# Patient Record
Sex: MALE | Race: WHITE | Employment: FULL TIME | URBAN - METROPOLITAN AREA
[De-identification: names, ages, dates, MRNs, and addresses within clinical notes are randomized per-mention and may not be internally consistent; named-entity substitution may affect disease eponyms.]

---

## 2018-06-06 ENCOUNTER — APPOINTMENT (OUTPATIENT)
Dept: ULTRASOUND IMAGING | Age: 38
End: 2018-06-06

## 2018-06-06 ENCOUNTER — APPOINTMENT (OUTPATIENT)
Dept: CT IMAGING | Age: 38
End: 2018-06-06

## 2018-06-06 ENCOUNTER — HOSPITAL ENCOUNTER (EMERGENCY)
Age: 38
Discharge: HOME | End: 2018-06-06
Attending: EMERGENCY MEDICINE

## 2018-06-06 VITALS
SYSTOLIC BLOOD PRESSURE: 132 MMHG | OXYGEN SATURATION: 98 % | RESPIRATION RATE: 16 BRPM | HEIGHT: 71 IN | TEMPERATURE: 97.5 F | HEART RATE: 62 BPM | BODY MASS INDEX: 25.2 KG/M2 | WEIGHT: 180 LBS | DIASTOLIC BLOOD PRESSURE: 90 MMHG

## 2018-06-06 DIAGNOSIS — R59.0 LYMPHADENOPATHY, INGUINAL: ICD-10-CM

## 2018-06-06 DIAGNOSIS — N43.3 HYDROCELE IN ADULT: ICD-10-CM

## 2018-06-06 DIAGNOSIS — N20.0 RENAL CALCULUS OR STONE: Primary | ICD-10-CM

## 2018-06-06 DIAGNOSIS — I86.1 LEFT VARICOCELE: ICD-10-CM

## 2018-06-06 LAB
ALBUMIN SERPL-MCNC: 4.6 G/DL (ref 3.5–5.2)
ALP BLD-CCNC: 67 U/L (ref 40–129)
ALT SERPL-CCNC: 23 U/L (ref 0–40)
ANION GAP SERPL CALCULATED.3IONS-SCNC: 12 MMOL/L (ref 7–16)
AST SERPL-CCNC: 32 U/L (ref 0–39)
BACTERIA: NORMAL /HPF
BASOPHILS ABSOLUTE: 0.05 E9/L (ref 0–0.2)
BASOPHILS RELATIVE PERCENT: 0.8 % (ref 0–2)
BILIRUB SERPL-MCNC: 0.5 MG/DL (ref 0–1.2)
BILIRUBIN URINE: NEGATIVE
BLOOD, URINE: NEGATIVE
BUN BLDV-MCNC: 9 MG/DL (ref 6–20)
CALCIUM SERPL-MCNC: 9.8 MG/DL (ref 8.6–10.2)
CHLORIDE BLD-SCNC: 100 MMOL/L (ref 98–107)
CLARITY: CLEAR
CO2: 28 MMOL/L (ref 22–29)
COLOR: YELLOW
CREAT SERPL-MCNC: 0.8 MG/DL (ref 0.7–1.2)
EOSINOPHILS ABSOLUTE: 0.09 E9/L (ref 0.05–0.5)
EOSINOPHILS RELATIVE PERCENT: 1.5 % (ref 0–6)
GFR AFRICAN AMERICAN: >60
GFR NON-AFRICAN AMERICAN: >60 ML/MIN/1.73
GLUCOSE BLD-MCNC: 114 MG/DL (ref 74–109)
GLUCOSE URINE: NEGATIVE MG/DL
HCT VFR BLD CALC: 47.9 % (ref 37–54)
HEMOGLOBIN: 15.8 G/DL (ref 12.5–16.5)
IMMATURE GRANULOCYTES #: 0.02 E9/L
IMMATURE GRANULOCYTES %: 0.3 % (ref 0–5)
KETONES, URINE: NEGATIVE MG/DL
LACTIC ACID, SEPSIS: 1.7 MMOL/L (ref 0.5–1.9)
LEUKOCYTE ESTERASE, URINE: NEGATIVE
LYMPHOCYTES ABSOLUTE: 1.54 E9/L (ref 1.5–4)
LYMPHOCYTES RELATIVE PERCENT: 25.7 % (ref 20–42)
MCH RBC QN AUTO: 30 PG (ref 26–35)
MCHC RBC AUTO-ENTMCNC: 33 % (ref 32–34.5)
MCV RBC AUTO: 91.1 FL (ref 80–99.9)
MONOCYTES ABSOLUTE: 0.49 E9/L (ref 0.1–0.95)
MONOCYTES RELATIVE PERCENT: 8.2 % (ref 2–12)
NEUTROPHILS ABSOLUTE: 3.8 E9/L (ref 1.8–7.3)
NEUTROPHILS RELATIVE PERCENT: 63.5 % (ref 43–80)
NITRITE, URINE: NEGATIVE
PDW BLD-RTO: 12.9 FL (ref 11.5–15)
PH UA: 8 (ref 5–9)
PLATELET # BLD: 142 E9/L (ref 130–450)
PMV BLD AUTO: 11.7 FL (ref 7–12)
POTASSIUM SERPL-SCNC: 4.7 MMOL/L (ref 3.5–5)
PROTEIN UA: NEGATIVE MG/DL
RBC # BLD: 5.26 E12/L (ref 3.8–5.8)
RBC UA: NORMAL /HPF (ref 0–2)
SODIUM BLD-SCNC: 140 MMOL/L (ref 132–146)
SPECIFIC GRAVITY UA: 1.02 (ref 1–1.03)
TOTAL PROTEIN: 7.7 G/DL (ref 6.4–8.3)
UROBILINOGEN, URINE: 0.2 E.U./DL
WBC # BLD: 6 E9/L (ref 4.5–11.5)
WBC UA: NORMAL /HPF (ref 0–5)

## 2018-06-06 PROCEDURE — 2580000003 HC RX 258

## 2018-06-06 PROCEDURE — 96374 THER/PROPH/DIAG INJ IV PUSH: CPT

## 2018-06-06 PROCEDURE — 80053 COMPREHEN METABOLIC PANEL: CPT

## 2018-06-06 PROCEDURE — 81001 URINALYSIS AUTO W/SCOPE: CPT

## 2018-06-06 PROCEDURE — 6360000002 HC RX W HCPCS: Performed by: EMERGENCY MEDICINE

## 2018-06-06 PROCEDURE — 76870 US EXAM SCROTUM: CPT

## 2018-06-06 PROCEDURE — 85025 COMPLETE CBC W/AUTO DIFF WBC: CPT

## 2018-06-06 PROCEDURE — 93975 VASCULAR STUDY: CPT

## 2018-06-06 PROCEDURE — 74176 CT ABD & PELVIS W/O CONTRAST: CPT

## 2018-06-06 PROCEDURE — 83605 ASSAY OF LACTIC ACID: CPT

## 2018-06-06 PROCEDURE — 99284 EMERGENCY DEPT VISIT MOD MDM: CPT

## 2018-06-06 RX ORDER — SODIUM CHLORIDE 0.9 % (FLUSH) 0.9 %
SYRINGE (ML) INJECTION
Status: COMPLETED
Start: 2018-06-06 | End: 2018-06-06

## 2018-06-06 RX ORDER — KETOROLAC TROMETHAMINE 30 MG/ML
15 INJECTION, SOLUTION INTRAMUSCULAR; INTRAVENOUS ONCE
Status: COMPLETED | OUTPATIENT
Start: 2018-06-06 | End: 2018-06-06

## 2018-06-06 RX ADMIN — KETOROLAC TROMETHAMINE 15 MG: 30 INJECTION, SOLUTION INTRAMUSCULAR at 11:46

## 2018-06-06 RX ADMIN — Medication 10 ML: at 11:47

## 2018-06-06 ASSESSMENT — ENCOUNTER SYMPTOMS
CONSTIPATION: 0
VOMITING: 0
DIARRHEA: 0
ABDOMINAL PAIN: 0
BACK PAIN: 0
COLOR CHANGE: 0
NAUSEA: 0

## 2018-06-06 ASSESSMENT — PAIN DESCRIPTION - DESCRIPTORS: DESCRIPTORS: BURNING

## 2018-06-06 ASSESSMENT — PAIN DESCRIPTION - PAIN TYPE: TYPE: ACUTE PAIN

## 2018-06-06 ASSESSMENT — PAIN DESCRIPTION - LOCATION: LOCATION: GROIN

## 2018-06-06 ASSESSMENT — PAIN SCALES - WONG BAKER: WONGBAKER_NUMERICALRESPONSE: 2

## 2018-06-06 ASSESSMENT — PAIN SCALES - GENERAL
PAINLEVEL_OUTOF10: 3
PAINLEVEL_OUTOF10: 7

## 2021-09-14 ENCOUNTER — OFFICE VISIT (OUTPATIENT)
Dept: FAMILY MEDICINE CLINIC | Age: 41
End: 2021-09-14

## 2021-09-14 VITALS
HEART RATE: 65 BPM | BODY MASS INDEX: 23.71 KG/M2 | DIASTOLIC BLOOD PRESSURE: 82 MMHG | OXYGEN SATURATION: 98 % | WEIGHT: 170 LBS | SYSTOLIC BLOOD PRESSURE: 122 MMHG | TEMPERATURE: 97.8 F

## 2021-09-14 DIAGNOSIS — R07.9 CHEST PAIN, UNSPECIFIED TYPE: Primary | ICD-10-CM

## 2021-09-14 PROCEDURE — 99203 OFFICE O/P NEW LOW 30 MIN: CPT | Performed by: PHYSICIAN ASSISTANT

## 2021-09-14 PROCEDURE — 93000 ELECTROCARDIOGRAM COMPLETE: CPT | Performed by: PHYSICIAN ASSISTANT

## 2021-09-14 RX ORDER — OMEPRAZOLE 40 MG/1
CAPSULE, DELAYED RELEASE ORAL
COMMUNITY
Start: 2021-08-18

## 2021-09-14 RX ORDER — TADALAFIL 5 MG/1
TABLET ORAL
Status: ON HOLD | COMMUNITY
Start: 2021-08-18 | End: 2021-09-18 | Stop reason: HOSPADM

## 2021-09-14 ASSESSMENT — ENCOUNTER SYMPTOMS
COUGH: 0
SHORTNESS OF BREATH: 0
NAUSEA: 0
VOMITING: 0
BACK PAIN: 0
DIARRHEA: 0
PHOTOPHOBIA: 0
ABDOMINAL PAIN: 0
SORE THROAT: 0

## 2021-09-14 NOTE — PROGRESS NOTES
photophobia and visual disturbance. Respiratory: Negative for cough and shortness of breath. Cardiovascular: Positive for chest pain. Gastrointestinal: Negative for abdominal pain, diarrhea, nausea and vomiting. Genitourinary: Negative for difficulty urinating, dysuria, frequency and urgency. Musculoskeletal: Negative for back pain, neck pain and neck stiffness. Skin: Negative for rash. Neurological: Negative for dizziness, syncope, weakness, light-headedness and headaches. Hematological: Negative for adenopathy. Does not bruise/bleed easily. Psychiatric/Behavioral: Negative for agitation and confusion. All other systems reviewed and are negative. PMH:     Past Medical History:   Diagnosis Date    Kidney stone        Past Surgical History:   Procedure Laterality Date    HERNIA REPAIR      TONSILLECTOMY         History reviewed. No pertinent family history. Medications:     Current Outpatient Medications:     tadalafil (CIALIS) 5 MG tablet, TAKE ONE TABLET BY MOUTH EVERY DAY, Disp: , Rfl:     omeprazole (PRILOSEC) 40 MG delayed release capsule, TAKE ONE CAPSULE BY MOUTH EVERY DAY before a meal, Disp: , Rfl:     Allergies: Allergies   Allergen Reactions    Vicodin [Hydrocodone-Acetaminophen] Itching       Social History:     Social History     Tobacco Use    Smoking status: Current Every Day Smoker     Packs/day: 0.50     Types: Cigarettes    Smokeless tobacco: Never Used   Substance Use Topics    Alcohol use: No    Drug use: No       Patient lives at home. Physical Exam:     Vitals:    09/14/21 1533   BP: 122/82   Pulse: 65   Temp: 97.8 °F (36.6 °C)   TempSrc: Temporal   SpO2: 98%   Weight: 170 lb (77.1 kg)       Exam:  Physical Exam  Vitals and nursing note reviewed. Constitutional:       General: He is not in acute distress. Appearance: He is well-developed. HENT:      Head: Normocephalic and atraumatic.    Eyes:      Conjunctiva/sclera: Conjunctivae normal. unspecified type  -     EKG 12 lead; Future  -     EKG 12 lead        Go directly to the emergency department. SIGNATURE: Shameka Hernandez PA-C

## 2021-09-17 ENCOUNTER — HOSPITAL ENCOUNTER (OUTPATIENT)
Age: 41
Setting detail: OBSERVATION
Discharge: HOME | End: 2021-09-18
Attending: FAMILY MEDICINE | Admitting: FAMILY MEDICINE

## 2021-09-17 DIAGNOSIS — I25.10 CAD IN NATIVE ARTERY: ICD-10-CM

## 2021-09-17 PROBLEM — Z72.0 TOBACCO ABUSE: Status: ACTIVE | Noted: 2021-09-17

## 2021-09-17 PROBLEM — F12.90 MARIJUANA USE: Status: ACTIVE | Noted: 2021-09-17

## 2021-09-17 PROBLEM — K21.9 GERD (GASTROESOPHAGEAL REFLUX DISEASE): Status: ACTIVE | Noted: 2021-09-17

## 2021-09-17 PROBLEM — I24.9 ACS (ACUTE CORONARY SYNDROME) (HCC): Status: ACTIVE | Noted: 2021-09-17

## 2021-09-17 LAB
POC ACT LR: 232 SECONDS
POC ACT LR: 267 SECONDS
POC ACT LR: >400 SECONDS

## 2021-09-17 PROCEDURE — 2580000003 HC RX 258: Performed by: INTERNAL MEDICINE

## 2021-09-17 PROCEDURE — G0378 HOSPITAL OBSERVATION PER HR: HCPCS

## 2021-09-17 PROCEDURE — C1894 INTRO/SHEATH, NON-LASER: HCPCS

## 2021-09-17 PROCEDURE — C1725 CATH, TRANSLUMIN NON-LASER: HCPCS

## 2021-09-17 PROCEDURE — 2709999900 HC NON-CHARGEABLE SUPPLY

## 2021-09-17 PROCEDURE — G0379 DIRECT REFER HOSPITAL OBSERV: HCPCS

## 2021-09-17 PROCEDURE — 93454 CORONARY ARTERY ANGIO S&I: CPT | Performed by: INTERNAL MEDICINE

## 2021-09-17 PROCEDURE — C1874 STENT, COATED/COV W/DEL SYS: HCPCS

## 2021-09-17 PROCEDURE — 2500000003 HC RX 250 WO HCPCS

## 2021-09-17 PROCEDURE — 85347 COAGULATION TIME ACTIVATED: CPT

## 2021-09-17 PROCEDURE — 6370000000 HC RX 637 (ALT 250 FOR IP)

## 2021-09-17 PROCEDURE — C1769 GUIDE WIRE: HCPCS

## 2021-09-17 PROCEDURE — 92928 PRQ TCAT PLMT NTRAC ST 1 LES: CPT | Performed by: INTERNAL MEDICINE

## 2021-09-17 PROCEDURE — 6370000000 HC RX 637 (ALT 250 FOR IP): Performed by: INTERNAL MEDICINE

## 2021-09-17 PROCEDURE — C1887 CATHETER, GUIDING: HCPCS

## 2021-09-17 PROCEDURE — 6360000002 HC RX W HCPCS

## 2021-09-17 RX ORDER — SODIUM CHLORIDE 9 MG/ML
INJECTION, SOLUTION INTRAVENOUS CONTINUOUS
Status: ACTIVE | OUTPATIENT
Start: 2021-09-17 | End: 2021-09-18

## 2021-09-17 RX ORDER — SODIUM CHLORIDE 9 MG/ML
25 INJECTION, SOLUTION INTRAVENOUS PRN
Status: DISCONTINUED | OUTPATIENT
Start: 2021-09-17 | End: 2021-09-18 | Stop reason: HOSPADM

## 2021-09-17 RX ORDER — PANTOPRAZOLE SODIUM 40 MG/1
40 TABLET, DELAYED RELEASE ORAL
Status: DISCONTINUED | OUTPATIENT
Start: 2021-09-18 | End: 2021-09-18 | Stop reason: HOSPADM

## 2021-09-17 RX ORDER — ASPIRIN 81 MG/1
81 TABLET ORAL DAILY
Status: DISCONTINUED | OUTPATIENT
Start: 2021-09-18 | End: 2021-09-18 | Stop reason: HOSPADM

## 2021-09-17 RX ORDER — SODIUM CHLORIDE 0.9 % (FLUSH) 0.9 %
5-40 SYRINGE (ML) INJECTION EVERY 12 HOURS SCHEDULED
Status: DISCONTINUED | OUTPATIENT
Start: 2021-09-17 | End: 2021-09-18 | Stop reason: HOSPADM

## 2021-09-17 RX ORDER — ATORVASTATIN CALCIUM 40 MG/1
40 TABLET, FILM COATED ORAL NIGHTLY
Status: DISCONTINUED | OUTPATIENT
Start: 2021-09-17 | End: 2021-09-18 | Stop reason: HOSPADM

## 2021-09-17 RX ORDER — SODIUM CHLORIDE 0.9 % (FLUSH) 0.9 %
5-40 SYRINGE (ML) INJECTION PRN
Status: DISCONTINUED | OUTPATIENT
Start: 2021-09-17 | End: 2021-09-18 | Stop reason: HOSPADM

## 2021-09-17 RX ORDER — NICOTINE 21 MG/24HR
1 PATCH, TRANSDERMAL 24 HOURS TRANSDERMAL EVERY 24 HOURS
COMMUNITY
End: 2021-10-22

## 2021-09-17 RX ORDER — NICOTINE 21 MG/24HR
1 PATCH, TRANSDERMAL 24 HOURS TRANSDERMAL EVERY 24 HOURS
Status: DISCONTINUED | OUTPATIENT
Start: 2021-09-17 | End: 2021-09-18 | Stop reason: HOSPADM

## 2021-09-17 RX ADMIN — TICAGRELOR 90 MG: 90 TABLET ORAL at 22:07

## 2021-09-17 RX ADMIN — SODIUM CHLORIDE, PRESERVATIVE FREE 10 ML: 5 INJECTION INTRAVENOUS at 22:32

## 2021-09-17 RX ADMIN — SODIUM CHLORIDE: 9 INJECTION, SOLUTION INTRAVENOUS at 22:30

## 2021-09-17 RX ADMIN — METOPROLOL TARTRATE 25 MG: 25 TABLET, FILM COATED ORAL at 22:06

## 2021-09-17 RX ADMIN — ATORVASTATIN CALCIUM 40 MG: 40 TABLET, FILM COATED ORAL at 22:06

## 2021-09-17 ASSESSMENT — PAIN SCALES - GENERAL
PAINLEVEL_OUTOF10: 0

## 2021-09-18 VITALS
HEART RATE: 84 BPM | HEIGHT: 71 IN | TEMPERATURE: 97.6 F | DIASTOLIC BLOOD PRESSURE: 92 MMHG | BODY MASS INDEX: 23.52 KG/M2 | WEIGHT: 168 LBS | OXYGEN SATURATION: 98 % | SYSTOLIC BLOOD PRESSURE: 137 MMHG | RESPIRATION RATE: 16 BRPM

## 2021-09-18 LAB
ANION GAP SERPL CALCULATED.3IONS-SCNC: 13 MMOL/L (ref 7–16)
BUN BLDV-MCNC: 11 MG/DL (ref 6–20)
CALCIUM SERPL-MCNC: 8.6 MG/DL (ref 8.6–10.2)
CHLORIDE BLD-SCNC: 103 MMOL/L (ref 98–107)
CO2: 19 MMOL/L (ref 22–29)
CREAT SERPL-MCNC: 0.9 MG/DL (ref 0.7–1.2)
GFR AFRICAN AMERICAN: >60
GFR NON-AFRICAN AMERICAN: >60 ML/MIN/1.73
GLUCOSE BLD-MCNC: 92 MG/DL (ref 74–99)
HCT VFR BLD CALC: 42.5 % (ref 37–54)
HEMOGLOBIN: 14.2 G/DL (ref 12.5–16.5)
MCH RBC QN AUTO: 29.7 PG (ref 26–35)
MCHC RBC AUTO-ENTMCNC: 33.4 % (ref 32–34.5)
MCV RBC AUTO: 88.9 FL (ref 80–99.9)
PDW BLD-RTO: 12.9 FL (ref 11.5–15)
PLATELET # BLD: 166 E9/L (ref 130–450)
PMV BLD AUTO: 11.1 FL (ref 7–12)
POC ACT LR: 220 SECONDS
POTASSIUM SERPL-SCNC: 4.1 MMOL/L (ref 3.5–5)
RBC # BLD: 4.78 E12/L (ref 3.8–5.8)
SODIUM BLD-SCNC: 135 MMOL/L (ref 132–146)
WBC # BLD: 13.3 E9/L (ref 4.5–11.5)

## 2021-09-18 PROCEDURE — 6370000000 HC RX 637 (ALT 250 FOR IP): Performed by: INTERNAL MEDICINE

## 2021-09-18 PROCEDURE — 85027 COMPLETE CBC AUTOMATED: CPT

## 2021-09-18 PROCEDURE — 80048 BASIC METABOLIC PNL TOTAL CA: CPT

## 2021-09-18 PROCEDURE — 2580000003 HC RX 258: Performed by: INTERNAL MEDICINE

## 2021-09-18 PROCEDURE — 36415 COLL VENOUS BLD VENIPUNCTURE: CPT

## 2021-09-18 PROCEDURE — G0378 HOSPITAL OBSERVATION PER HR: HCPCS

## 2021-09-18 RX ORDER — ATORVASTATIN CALCIUM 40 MG/1
40 TABLET, FILM COATED ORAL NIGHTLY
Qty: 30 TABLET | Refills: 0 | Status: SHIPPED | OUTPATIENT
Start: 2021-09-18

## 2021-09-18 RX ORDER — METOPROLOL SUCCINATE 25 MG/1
25 TABLET, EXTENDED RELEASE ORAL DAILY
Status: DISCONTINUED | OUTPATIENT
Start: 2021-09-18 | End: 2021-09-18 | Stop reason: HOSPADM

## 2021-09-18 RX ORDER — NITROGLYCERIN 0.4 MG/1
0.4 TABLET SUBLINGUAL EVERY 5 MIN PRN
Status: DISCONTINUED | OUTPATIENT
Start: 2021-09-18 | End: 2021-09-18 | Stop reason: HOSPADM

## 2021-09-18 RX ORDER — NITROGLYCERIN 0.4 MG/1
TABLET SUBLINGUAL
Qty: 25 TABLET | Refills: 0 | Status: SHIPPED | OUTPATIENT
Start: 2021-09-18

## 2021-09-18 RX ORDER — ASPIRIN 81 MG/1
81 TABLET ORAL DAILY
Qty: 30 TABLET | Refills: 0 | Status: SHIPPED | OUTPATIENT
Start: 2021-09-19

## 2021-09-18 RX ORDER — METOPROLOL SUCCINATE 25 MG/1
25 TABLET, EXTENDED RELEASE ORAL DAILY
Qty: 30 TABLET | Refills: 0 | Status: SHIPPED | OUTPATIENT
Start: 2021-09-19

## 2021-09-18 RX ORDER — ACETAMINOPHEN 325 MG/1
650 TABLET ORAL EVERY 4 HOURS PRN
Status: DISCONTINUED | OUTPATIENT
Start: 2021-09-18 | End: 2021-09-18 | Stop reason: HOSPADM

## 2021-09-18 RX ADMIN — TICAGRELOR 90 MG: 90 TABLET ORAL at 09:24

## 2021-09-18 RX ADMIN — PANTOPRAZOLE SODIUM 40 MG: 40 TABLET, DELAYED RELEASE ORAL at 06:10

## 2021-09-18 RX ADMIN — METOPROLOL SUCCINATE 25 MG: 25 TABLET, EXTENDED RELEASE ORAL at 09:24

## 2021-09-18 RX ADMIN — SODIUM CHLORIDE, PRESERVATIVE FREE 10 ML: 5 INJECTION INTRAVENOUS at 09:24

## 2021-09-18 RX ADMIN — ACETAMINOPHEN 650 MG: 325 TABLET ORAL at 02:30

## 2021-09-18 RX ADMIN — ASPIRIN 81 MG: 81 TABLET, COATED ORAL at 09:24

## 2021-09-18 ASSESSMENT — PAIN DESCRIPTION - ORIENTATION: ORIENTATION: RIGHT;LEFT

## 2021-09-18 ASSESSMENT — PAIN SCALES - GENERAL
PAINLEVEL_OUTOF10: 0
PAINLEVEL_OUTOF10: 3
PAINLEVEL_OUTOF10: 0

## 2021-09-18 ASSESSMENT — PAIN DESCRIPTION - LOCATION: LOCATION: RIB CAGE

## 2021-09-18 ASSESSMENT — PAIN DESCRIPTION - PAIN TYPE: TYPE: ACUTE PAIN

## 2021-09-18 ASSESSMENT — PAIN DESCRIPTION - DESCRIPTORS: DESCRIPTORS: ACHING

## 2021-09-18 NOTE — DISCHARGE SUMMARY
Hospitalist Discharge Summary    Patient ID: Rona Momin   Patient : 1980  Patient's PCP: No primary care provider on file. Admit Date: 2021   Admitting Physician: Jabier Boland MD    Discharge Date:  2021   Discharge Physician: Maurice Angelucci, MD   Discharge Condition: Stable  Discharge Disposition: [facility] course in brief:  (Please refer to daily progress notes for a comprehensive review of the hospitalization by requesting medical records)      The patient is a 42-year-old male with smoking who presented to the [facility] complaining of chest pain. The patient was found to have non-ST-elevation MI with abnormal stress test.  The patient was brought to [facility] and then to cath lab for further evaluation. The pt underwent cardiac cath on 21 and had LAD KARYNA x2. Plan  Is for staged outpatient RCA intervention in the next couple weeks by cardiology. Continue dual antiplatelet therapy, statin, beta-blocker and as needed sublingual nitroglycerin. The patient states that he feels well and will stop smoking. Cardiology office is to arrange a follow up appointment and cleared the pt for discharge. The patient is stable for discharge home today. Consults:   IP CONSULT TO CARDIOLOGY    Discharge Diagnoses:    NSTEMI  CAD  GERD  Tobacco abuse. Discharge Instructions / Follow up:    PCP in a week  Cardiology office to make appointment for follow up.      Continued appropriate risk factor modification of blood pressure, diabetes and serum lipids will remain essential to reducing risk of future atherosclerotic development    Activity: activity as tolerated    Significant labs:  CBC:   Recent Labs     21  0531 21  0441   WBC 8.8 13.3*   RBC 5.02 4.78   HGB 15.2 14.2   HCT 44.2 42.5   MCV 88.0 88.9   RDW 13.7 12.9    166     BMP:   Recent Labs     21  0531 21  0441    135   K 4.1 4.1    103   CO2 24 19*   BUN 11 11 CREATININE 0.9 0.9   MG 1.6  --      LFT:  No results for input(s): PROT, ALB, ALKPHOS, ALT, AST, BILITOT, AMYLASE, LIPASE in the last 72 hours. PT/INR: No results for input(s): INR, APTT in the last 72 hours. BNP: No results for input(s): BNP in the last 72 hours. Hgb A1C: No results found for: LABA1C  Folate and B12: No results found for: [id number], No results found for: FOLATE  Thyroid Studies:   Lab Results   Component Value Date    TSH 1.49 2021       Urinalysis:    Lab Results   Component Value Date    NITRU Negative 2018    WBCUA NEGATIVE 2021    BACTERIA NONE 2018    RBCUA NEGATIVE 2021    BLOODU Negative 2018    SPECGRAV 1.025 2018    GLUCOSEU NEGATIVE 2021       Imaging:  NM MYOCARDIAL SPECT REST EXERCISE OR RX    Result Date: 2021                                      [address]                                              [address]                                              [phone]                                          Nuclear Medicine Report                                                  Signed    Patient: Tali Kumar                                                                MR#: [id number]  [id number]          : 1980                                                                [de-identified]            Age/Sex: 37 / M                                                                Admit Date: 21            Loc: 2             2022                                                                  Attending Dr: Alvin Sharp MD    Ordering Physician: Cristin Meier MD   Date of Service: 21  Procedure(s): NM myocardial perf multi tread  Accession Number(s): [id number]    cc: Cristin Meier MD      History:  Chest pain at stress, abnormal EKG, HTN.        Technique: Cardiac SPECT images with 10 mCi Tc-99m Sestamibi at Rest and 30 mCi at Stress. The   stress method was Treadmill. A gated cardiac SPECT examination was performed following the stress   procedure. Findings: An unmatched stress defect is seen in the septum, apex and anterior wall near apex. There appears to be abnormal myocardial wall motion on the gated images performed after the stress   procedure with akinesis of the apex. The left ventricular ejection fraction measures 77%. Impression:   1. There is evidence of moderate ischemia of the septum, apex and anterior wall near apex. 2.   There is abnormal myocardial wall motion on the gated examination in the apex. 3.  The left ventricular ejection fraction measures 77% which is normal.       **[facility] [facility] notified of availability of abnormal results in system at 1207 on 9/17/2021 by Marixa Delgado. **               Dictated ByTory Samano MD   DD/DT: 09/17/21 1159               Signed By: Sindy Norman MD 09/17/21 1158            :      Discharge Medications:      Medication List      START taking these medications    aspirin 81 MG EC tablet  Take 1 tablet by mouth daily  Start taking on: September 19, 2021     atorvastatin 40 MG tablet  Commonly known as: LIPITOR  Take 1 tablet by mouth nightly     metoprolol succinate 25 MG extended release tablet  Commonly known as: TOPROL XL  Take 1 tablet by mouth daily  Start taking on: September 19, 2021     nitroGLYCERIN 0.4 MG SL tablet  Commonly known as: NITROSTAT  up to max of 3 total doses. If no relief after 1 dose, call 911.      ticagrelor 90 MG Tabs tablet  Commonly known as: BRILINTA  Take 1 tablet by mouth 2 times daily        CONTINUE taking these medications    nicotine 21 MG/24HR  Commonly known as: NICODERM CQ     omeprazole 40 MG delayed release capsule  Commonly known as: PRILOSEC        STOP taking these medications    tadalafil 5 MG tablet  Commonly known as: CIALIS           Where to Get Your Medications These medications were sent to [facility], [address] [address], [facility]    Phone: [phone]   · aspirin 81 MG EC tablet  · atorvastatin 40 MG tablet  · metoprolol succinate 25 MG extended release tablet  · nitroGLYCERIN 0.4 MG SL tablet  · ticagrelor 90 MG Tabs tablet         Time Spent on discharge is more than 45 minutes in the examination, evaluation, counseling and review of medications and discharge plan.    +++++++++++++++++++++++++++++++++++++++++++++++++  Rachelle Corley MD  [address]  +++++++++++++++++++++++++++++++++++++++++++++++++  NOTE: This report was transcribed using voice recognition software. Every effort was made to ensure accuracy; however, inadvertent computerized transcription errors may be present.

## 2021-09-18 NOTE — PROGRESS NOTES
Removed vasc Band per order and per protocol site soft no Hematoma noted.   Placed 2x2 folded with opsite dressing over it

## 2021-09-18 NOTE — PROGRESS NOTES
[facility] Cardiology    INPATIENT CARDIOLOGY FOLLOW-UP    Name: José Miguel Cardenas    Age: 37 y.o. Date of Admission: 9/17/2021  5:39 PM    Date of Service: 9/18/2021    Chief Complaint: Follow-up for coronary artery disease    Interim History:  Underwent overlapping LAD KARYNA yesterday and also has moderate to severe proximal RCA stenosis. No new overnight cardiac complaints. Currently with no complaints of CP, SOB, palpitations, dizziness, or lightheadedness. Telemetry  reviewed and showed sinus rhythm        Review of Systems:   Cardiac: As per HPI  General: No fever, chills  Pulmonary: No cough, wheeze, or shortness of breath  GI: No nausea, vomiting,or abdominal pain  Neuro: No headache or confusion      Allergies:   Allergies   Allergen Reactions    Vicodin [Hydrocodone-Acetaminophen] Itching       Current Medications:  Current Facility-Administered Medications   Medication Dose Route Frequency Provider Last Rate Last Admin    acetaminophen (TYLENOL) tablet 650 mg  650 mg Oral Q4H PRN Josef Borden MD   650 mg at 09/18/21 0230    sodium chloride flush 0.9 % injection 5-40 mL  5-40 mL IntraVENous 2 times per day Jennifer Urena MD   10 mL at 09/17/21 2232    sodium chloride flush 0.9 % injection 5-40 mL  5-40 mL IntraVENous PRN Jennifer Urena MD        0.9 % sodium chloride infusion  25 mL IntraVENous PRN Jennifer Urena MD        aspirin EC tablet 81 mg  81 mg Oral Daily Jennifer Urena MD        ticagrelor (BRILINTA) tablet 90 mg  90 mg Oral BID Jennifer Urena MD   90 mg at 09/17/21 2207    nicotine (NICODERM CQ) 21 MG/24HR 1 patch  1 patch TransDERmal Q24H Jennifer Urena MD        pantoprazole (PROTONIX) tablet 40 mg  40 mg Oral QAM AC Jennifer Urena MD   40 mg at 09/18/21 0610    atorvastatin (LIPITOR) tablet 40 mg  40 mg Oral Nightly Jennifer Urena MD   40 mg at 09/17/21 2206    metoprolol tartrate (LOPRESSOR) tablet 25 mg  25 mg Oral BID Jennifer Urena MD   25 mg at 09/17/21 2206      sodium chloride         Physical Exam:  /79   Pulse 60   Temp 97.5 °F (36.4 °C) (Temporal)   Resp 14   Ht 5' 11\" (1.803 m)   Wt 168 lb (76.2 kg)   SpO2 97%   BMI 23.43 kg/m²   Weight change: Wt Readings from Last 3 Encounters:   09/17/21 168 lb (76.2 kg)   09/14/21 170 lb (77.1 kg)   06/06/18 180 lb (81.6 kg)         Intake/Output:    Intake/Output Summary (Last 24 hours) at 9/18/2021 0736  Last data filed at 9/18/2021 0612  Gross per 24 hour   Intake 1522.5 ml   Output 1100 ml   Net 422.5 ml     No intake/output data recorded. General: Awake, alert, oriented x3, no acute distress    Neck: No JVD or carotid bruits,     Cardiac: Regular rate and rhythm, normal S1 and  S2, no murmurs, no S4 or S3, no  rubs. Normal carotid upstroke and no bruits. Resp: Unlabored respirations at rest.  No wheezes, rales or rhonchi. Abdomen: soft, nontender, nondistended, BS+; no masses, bruits, or hepatomegaly    Extremities: no cyanosis, clubbing, or edema. Normal pulses in the upper/lower extremities bilaterally. Skin: Warm and dry, no bruises, petechiae or rashes. Neuro: moves all extremities appropriately to command, and normal sensation to light touch in the upper and lower extremities bilaterally. Laboratory Tests:  Lab Results   Component Value Date    CREATININE 0.9 09/18/2021    BUN 11 09/18/2021     09/18/2021    K 4.1 09/18/2021     09/18/2021    CO2 19 (L) 09/18/2021     Recent Labs     09/16/21  1200 09/16/21  1804   CKTOTAL 635* 529*   CKMB 2.2 2.1   TROPONINI 0.09* 0.10*     No results found for: PROBNP  Lab Results   Component Value Date    WBC 13.3 09/18/2021    RBC 4.78 09/18/2021    HGB 14.2 09/18/2021    HCT 42.5 09/18/2021    MCV 88.9 09/18/2021    MCH 29.7 09/18/2021    MCHC 33.4 09/18/2021    RDW 12.9 09/18/2021     09/18/2021    MPV 11.1 09/18/2021     No results for input(s): ALKPHOS, ALT, AST, PROT, BILITOT, BILIDIR, LABALBU in the last 72 hours.   Lab Results Component Value Date    MG 1.6 09/17/2021     No results found for: PROTIME, INR  Lab Results   Component Value Date    TSH 1.49 09/17/2021     No components found for: CHLPL  Lab Results   Component Value Date    TRIG 84 09/17/2021     Lab Results   Component Value Date    HDL 35 09/17/2021     No results found for: [address]    Radiology:  No orders to display         Problem List:  Active Hospital Problems    Diagnosis     CAD, multiple vessel [I25.10]     ACS (acute coronary syndrome) ([facility] [address] [I24.9]     Tobacco abuse [Z72.0]     Marijuana use [F12.90]     GERD (gastroesophageal reflux disease) [K21.9]            ASSESSMENT/PLAN:  1. ACS status post LAD KARYNA x2. Plan for staged outpatient RCA intervention in the next couple weeks. Continue dual antiplatelet therapy, statin, beta-blocker and as needed sublingual nitroglycerin.   2. Smoking cessation advised    Okay for discharge and office will arrange follow-up appointment      Electronically signed by Marah Krishnamurthy MD on 9/18/2021 at 7:36 AM

## 2021-09-18 NOTE — PATIENT CARE CONFERENCE
P Quality Flow/Interdisciplinary Rounds Progress Note        Quality Flow Rounds held on September 18, 2021    Disciplines Attending:  Bedside Nurse, ,  and Nursing Unit Leadership    Tio Campos was admitted on 9/17/2021  5:39 PM    Anticipated Discharge Date:  Expected Discharge Date: 09/18/21    Disposition:    Travon Score:  Travon Scale Score: 22    Readmission Risk              Risk of Unplanned Readmission:  0           Discussed patient goal for the day, patient clinical progression, and barriers to discharge.   The following Goal(s) of the Day/Commitment(s) have been identified:  Discharge Planning      Donato Nugent  September 18, 2021

## 2021-09-18 NOTE — PROGRESS NOTES
Pulled Sheath no complications tender at upper groin site puffy but soft to palpate HX of Hernia. Removed sheath held pressure for 20 min monitored sitre after 20 min for 5 more min placed dressing and sandbag at site explained bedrest and to call if any pain poping felling or feeling of something running down leg .  expalined need to keep leg still and to call with any pain .   Only pain with pushing at upper pelvic area

## 2021-09-18 NOTE — H&P
[employer]  History and Physical      CHIEF COMPLAINT:  CP      HISTORY OF PRESENT ILLNESS:      The patient is a 37 y.o. male patient of Dr Beth who presents with CP initially admitted to [facility]. Min trop elevation w CP. Patient notes substernal chest pain worse with exertion. imProved with rest.  Patient denies any associated shortness of breath or diaphoresis. Pressure type pain. Recurred during stress test.  No chest pain since.  + Radiation to neck and shoulders    Past Medical History:    Past Medical History:   Diagnosis Date    CAD in native artery 9/17/2021    Hyperlipidemia     Hypertension     Kidney stone     Kidney stone        Past Surgical History:    Past Surgical History:   Procedure Laterality Date    CORONARY ANGIOPLASTY WITH STENT PLACEMENT  09/17/2021    Dr Jeffie Hodgkin - LAD Ryan x 2 Prox to mid   [address]         Medications Prior to Admission:    Medications Prior to Admission: nicotine (NICODERM CQ) 21 MG/24HR, Place 1 patch onto the skin every 24 hours  tadalafil (CIALIS) 5 MG tablet, TAKE ONE TABLET BY MOUTH EVERY DAY  omeprazole (PRILOSEC) 40 MG delayed release capsule, TAKE ONE CAPSULE BY MOUTH EVERY DAY before a meal    Allergies:    Vicodin [hydrocodone-acetaminophen]    Social History:    reports that he has been smoking cigarettes. He has been smoking about 0.50 packs per day. He has never used smokeless tobacco. He reports that he does not drink alcohol and does not use drugs. Family History:   + Family history of CAD    REVIEW OF SYSTEMS:  As above in the HPI, otherwise negative    PHYSICAL EXAM:    Vitals:  BP (!) 154/103   Pulse 62   Temp 98.7 °F (37.1 °C) (Temporal)   Resp 16   Ht 5' 11\" (1.803 m)   Wt 168 lb (76.2 kg)   SpO2 98%   BMI 23.43 kg/m²     General:  Awake, alert, oriented X 3. Well developed, well nourished, well groomed. No apparent distress. HEENT:  Normocephalic, atraumatic.   Pupils equal, round, reactive to light. No scleral icterus. No conjunctival injection. Normal lips, teeth, and gums. No nasal discharge. Neck:  Supple  Heart:  RRR, no murmurs, gallops, rubs  Lungs:  CTA bilaterally, bilat symmetrical expansion, no wheeze, rales, or rhonchi  Abdomen:   Bowel sounds present, soft, nontender, no masses, no organomegaly, no peritoneal signs  Extremities:  No clubbing, cyanosis, or edema  Skin:  Warm and dry, no open lesions or rash  Neuro:  Cranial nerves 2-12 intact, no focal deficits  Breast: deferred  Rectal: deferred  Genitalia:  deferred    LABS:    CBC with Differential:    Lab Results   Component Value Date    WBC 8.8 09/17/2021    RBC 5.02 09/17/2021    HGB 15.2 09/17/2021    HCT 44.2 09/17/2021     09/17/2021    MCV 88.0 09/17/2021    MCH 30.3 09/17/2021    MCHC 34.4 09/17/2021    RDW 13.7 09/17/2021    SEGSPCT 74.5 09/17/2021    LYMPHOPCT 16.7 09/17/2021    MONOPCT 7.3 09/17/2021    BASOPCT 0.7 09/17/2021    MONOSABS 0.6 09/17/2021    LYMPHSABS 1.5 09/17/2021    EOSABS 0.1 09/17/2021    BASOSABS 0.1 09/17/2021     CMP:    Lab Results   Component Value Date     09/17/2021    K 4.1 09/17/2021     09/17/2021    CO2 24 09/17/2021    BUN 11 09/17/2021    CREATININE 0.9 09/17/2021    GFRAA >60 06/06/2018    LABGLOM 93 09/17/2021    GLUCOSE 87 09/17/2021    GLUCOSE 84 02/18/2011    PROT 7.7 06/06/2018    LABALBU 4.6 06/06/2018    CALCIUM 9.0 09/17/2021    BILITOT 0.5 06/06/2018    ALKPHOS 67 06/06/2018    AST 32 06/06/2018    ALT 23 06/06/2018     Magnesium:    Lab Results   Component Value Date    MG 1.6 09/17/2021     Phosphorus:  No results found for: PHOS  PT/INR:  No results found for: PROTIME, INR  Last 3 Troponin:    Lab Results   Component Value Date    TROPONINI 0.10 09/16/2021    TROPONINI 0.09 09/16/2021    TROPONINI <0.01 02/18/2011    TROPONINI <0.01 10/04/2010     U/A:    Lab Results   Component Value Date    COLORU STRAW 09/16/2021    COLORU Yellow 06/06/2018    PROTEINU NEGATIVE 09/16/2021    PHUR 9.0 09/16/2021    WBCUA NEGATIVE 09/16/2021    RBCUA NEGATIVE 09/16/2021    BACTERIA NONE 06/06/2018    CLARITYU Clear 06/06/2018    SPECGRAV 1.025 06/06/2018    LEUKOCYTESUR Negative 06/06/2018    UROBILINOGEN NEGATIVE 09/16/2021    BILIRUBINUR NEGATIVE 09/16/2021    BLOODU Negative 06/06/2018    GLUCOSEU NEGATIVE 09/16/2021     ABG:  No results found for: PH, PCO2, PO2, HCO3, BE, THGB, TCO2, O2SAT  HgBA1c:  No results found for: LABA1C  FLP:    Lab Results   Component Value Date    TRIG 84 09/17/2021    HDL 35 09/17/2021     TSH:    Lab Results   Component Value Date    TSH 1.49 09/17/2021       No orders to display       ASSESSMENT:      Active Problems:    CAD, multiple vessel    ACS (acute coronary syndrome) (HCC)    Tobacco abuse    Marijuana use    GERD (gastroesophageal reflux disease)  Resolved Problems:    * No resolved hospital problems.  *      PLAN:    Admit monitored bed for ACS sp KARYNA to LAD 9/17  DAPT  hgh intensity statin  NTG   Cardiology cs    MV CAD sp stent to LAD  - plan staged intervention on RCA  meds as above    Tobacco cessation counseled     THC moderation/cessation counseled     GERD PPI    DVT PPx  DC planning        Electronically signed by Tiffany Lynch MD on 9/17/2021 at 8:10 PM

## 2021-09-18 NOTE — PROCEDURES
[address]                  [address] [facility],  [facility]                            CARDIAC CATHETERIZATION    PATIENT NAME: Malissa Navarrete                 :        1980  MED REC NO:   [id number]                            ROOM:       7157  ACCOUNT NO:   [de-identified]                           ADMIT DATE: 2021  PROVIDER:     Vladimir Hayden MD    DATE OF PROCEDURE:  2021    PROCEDURES:  1. Coronary angiography. 2.  Percutaneous coronary intervention with deployment of 2.5 x 26 mm  [place] Resolute drug-eluting stent overlapped with 2.5 x 15 mm Ryan  Resolute drug-eluting stent in the mid LAD. 3.  Conscious sedation using Versed and fentanyl. Indication #3, score of 8. Indication for PCI 16 and 7. INDICATION FOR PROCEDURE:  The patient is a 27-year-old male with  smoking who presented to the [facility] complaining of  chest pain. The patient was found to have non-ST-elevation MI with  abnormal stress test.  The patient was brought to the cath lab for  further evaluation. DESCRIPTION OF PROCEDURE:  After the appropriate informed consent, the  right wrist area was prepped and draped in the usual sterile fashion. A  timeout was called. The right wrist area was locally anesthetized with  2 mL of 2% lidocaine. A 21-gauge needle was used to access the right  radial artery. A 6-Malawian introducer sheath was used to cannulate the  right radial artery. 6-Malawian JL3.5 and 6-Malawian JR4 catheters were  used for coronary angiography in multiple projections. ANGIOGRAPHIC FINDINGS:  1. The left main coronary artery is a short vessel, bifurcates into  left anterior descending artery and left circumflex artery. 2.  The left anterior descending artery has significant disease in the  mid segment involving most of the mid segment with two areas of  sequential stenosis estimated at 90%.   There is a small first diagonal  branch with mild ostial disease. The LAD itself has 30% distal disease. 3.  The left circumflex artery is a large vessel, codominant  circulation. It gives off two OM branches. The first one is small to  mid size with 50% ostial disease. The second one is large without any  disease. There is a small left PLV noted. 4.  The right coronary artery arises normally from the right sinus of  Valsalva. The right coronary artery has significant ostial disease as  evident by dampening of pressure and lack of contrast reflux on  injection. There is another 50% to 60% disease involving the mid  segment at the takeoff of the second acute marginal branch. The RCA has  codominant circulation. After completing coronary angiography, we proceeded with percutaneous  coronary intervention. We started with a JL3.0 guiding catheter;  however, attempts to advance the catheter through the right radial  artery were met with difficulty, the patient's discomfort. Most likely,  it was vasospasm; however, did not respond to repeated doses of  intraarterial nitroglycerin. After a couple of attempts, right radial  approach was abandoned. Attention was directed to the right groin area which was locally  anesthetized with 10 mL of 2% lidocaine. A Cook needle was used to  access the right common femoral artery under real-time ultrasound  guidance. A 6-[place]oran introducer sheath was used to cannulate the right  femoral artery. A 6-[place]oran JL3.5 guiding catheter was used to engage  the left main coronary artery. A BMW wire was used to cross the lesion. The lesion was predilated using 2.5 x 15 mm balloon. Then, the lesion  was stented using 2.5 x 26 mm [place] Resolute drug-eluting stent. Attempts to advance a 3.25 or 3.0 balloon were met with difficulty. Then, we used a GuideLiner that was advanced to the proximal edge of the  stent and we were able to advance the 3.25 x 15 mm noncompliant balloon.   Postdilation and optimization of the mid and proximal segment of the  stent were performed. Followup angiography showed that the distal part  of the mid segment of the LAD estimated at, at least 50%, looked a lot  worse after fixing the proximal part, so decision was made to put  another stent which is 2.5 x 15 mm [place] Resolute drug-eluting stent  overlapped through the previously deployed stent. Then, the stent and  the overlapped area were postdilated using 2.5 x 15 mm balloon. Followup angiography showed 0% residual stenosis and AMIE-3 flow  distally. Pre-PCI stenosis was 90%. Post-PCI stenosis was 0%; pre-PCI,  AMIE-2 flow; post-PCI was AMIE-3 flow. There was still a 30% lesion in  the distal LAD that was left alone to be treated medically. At the end of the procedure, the catheter and the wire were pulled out  from the body. The right femoral sheath was sutured in place to be  pulled out in CSU. The right radial artery sheath was pulled out from  the body and a Vasc Band was applied to the right wrist area with  effective hemostasis. MEDICATIONS USED DURING THE PROCEDURE:  We used intraarterial verapamil  to prevent vasospasm for the right radial approach. We used  intraarterial heparin for anticoagulation. We used intraarterial  nitroglycerin to treat right radial artery vasospasm. We used also  intracoronary nitroglycerin to treat left anterior descending artery  Post stenting vasospasm. We used intravenous heparin for  anticoagulation for the interventional procedure. Also, the patient was  given 180 mg of Brilinta after completing coronary angiography. CONSCIOUS SEDATION:  We used Versed and fentanyl for conscious sedation. First dose was given at 1538. Procedure ended at . There were 89  minutes of direct face-to-face supervision during conscious sedation  administration. COMPLICATIONS:  None. ESTIMATED TOTAL BLOOD LOSS:  40 to 50 mL.     Total contrast used was 170 mL of Isovue. Total fluoroscopy time was 13.5 minutes. IMPRESSION:  1. Significant disease involving the mid LAD with successful deployment  of 2.5 x 26 and 2.5 x 15 mm [place] Resolute drug-eluting stents in  overlapping fashion with the proximal and mid segment of the proximal  stent and optimization were performed using 3.25 x 15 mm noncompliant  balloon (pre-PCI stenosis was 90%; post-PCI stenosis was 0%; pre-PCI,  AMIE-2 flow; post-PCI, AMIE-3 flow). 2.  Significant disease involving the ostial and proximal RCA as evident  by significant dampening of pressure and lack of contrast reflux. The  patient will be brought back for staged PCI to ostial and proximal RCA. 3.  Mild disease involving the ostium of the first OM branch and the  upper subbranch of the second OM artery. RECOMMENDATIONS:  1. Aspirin for life. 2.  P2Y12 inhibitors for at least a year, preferably longer. 3.  Aggressive coronary artery disease risk factor modifications. 4.  The patient will be brought back for a staged PCI to RCA, can be  done as an outpatient.         Crystal Mathur MD    D: 09/17/2021 17:37:56       T: 09/17/2021 18:47:06     [email or web address]  Job#: [id number]     Doc#: [id number]    CC:  Ras Alicia

## 2021-09-23 ENCOUNTER — TELEPHONE (OUTPATIENT)
Dept: CARDIOLOGY CLINIC | Age: 41
End: 2021-09-23

## 2021-09-23 NOTE — TELEPHONE ENCOUNTER
Spoke to patient - he has a lot of bruising from his last procedure.   He is trying to schedule a follow up with Dr Jeniffer Darnell and his PCP first.  He will call me to schedule

## 2021-10-21 ENCOUNTER — TELEPHONE (OUTPATIENT)
Dept: CARDIAC CATH/INVASIVE PROCEDURES | Age: 41
End: 2021-10-21

## 2021-10-22 ENCOUNTER — HOSPITAL ENCOUNTER (INPATIENT)
Dept: CARDIAC CATH/INVASIVE PROCEDURES | Age: 41
LOS: 1 days | Discharge: HOME | DRG: 247 | End: 2021-10-23
Attending: FAMILY MEDICINE | Admitting: FAMILY MEDICINE

## 2021-10-22 DIAGNOSIS — I25.10 CAD IN NATIVE ARTERY: ICD-10-CM

## 2021-10-22 PROBLEM — Z79.02 ENCOUNTER FOR LONG-TERM (CURRENT) USE OF ANTIPLATELETS/ANTITHROMBOTICS: Status: ACTIVE | Noted: 2021-10-22

## 2021-10-22 PROBLEM — E78.49 OTHER HYPERLIPIDEMIA: Status: ACTIVE | Noted: 2021-10-22

## 2021-10-22 PROBLEM — Z95.5 S/P CORONARY ARTERY STENT PLACEMENT: Status: ACTIVE | Noted: 2021-10-22

## 2021-10-22 LAB
ABO/RH: NORMAL
ANION GAP SERPL CALCULATED.3IONS-SCNC: 14 MMOL/L (ref 7–16)
ANTIBODY SCREEN: NORMAL
BASOPHILS ABSOLUTE: 0.04 E9/L (ref 0–0.2)
BASOPHILS RELATIVE PERCENT: 0.6 % (ref 0–2)
BUN BLDV-MCNC: 12 MG/DL (ref 6–20)
CALCIUM SERPL-MCNC: 9.2 MG/DL (ref 8.6–10.2)
CHLORIDE BLD-SCNC: 104 MMOL/L (ref 98–107)
CO2: 23 MMOL/L (ref 22–29)
CREAT SERPL-MCNC: 0.9 MG/DL (ref 0.7–1.2)
EOSINOPHILS ABSOLUTE: 0.08 E9/L (ref 0.05–0.5)
EOSINOPHILS RELATIVE PERCENT: 1.1 % (ref 0–6)
GFR AFRICAN AMERICAN: >60
GFR NON-AFRICAN AMERICAN: >60 ML/MIN/1.73
GLUCOSE BLD-MCNC: 98 MG/DL (ref 74–99)
HCT VFR BLD CALC: 42.8 % (ref 37–54)
HEMOGLOBIN: 14.6 G/DL (ref 12.5–16.5)
IMMATURE GRANULOCYTES #: 0.01 E9/L
IMMATURE GRANULOCYTES %: 0.1 % (ref 0–5)
LYMPHOCYTES ABSOLUTE: 1.2 E9/L (ref 1.5–4)
LYMPHOCYTES RELATIVE PERCENT: 16.9 % (ref 20–42)
MCH RBC QN AUTO: 29.6 PG (ref 26–35)
MCHC RBC AUTO-ENTMCNC: 34.1 % (ref 32–34.5)
MCV RBC AUTO: 86.6 FL (ref 80–99.9)
MONOCYTES ABSOLUTE: 1.03 E9/L (ref 0.1–0.95)
MONOCYTES RELATIVE PERCENT: 14.5 % (ref 2–12)
NEUTROPHILS ABSOLUTE: 4.73 E9/L (ref 1.8–7.3)
NEUTROPHILS RELATIVE PERCENT: 66.8 % (ref 43–80)
PDW BLD-RTO: 13.2 FL (ref 11.5–15)
PLATELET # BLD: 152 E9/L (ref 130–450)
PMV BLD AUTO: 11.3 FL (ref 7–12)
POC ACT LR: 168 SECONDS
POC ACT LR: 182 SECONDS
POC ACT LR: 221 SECONDS
POC ACT LR: 229 SECONDS
POC ACT LR: 330 SECONDS
POTASSIUM SERPL-SCNC: 3.8 MMOL/L (ref 3.5–5)
RBC # BLD: 4.94 E12/L (ref 3.8–5.8)
SODIUM BLD-SCNC: 141 MMOL/L (ref 132–146)
WBC # BLD: 7.1 E9/L (ref 4.5–11.5)

## 2021-10-22 PROCEDURE — 2500000003 HC RX 250 WO HCPCS

## 2021-10-22 PROCEDURE — 86900 BLOOD TYPING SEROLOGIC ABO: CPT

## 2021-10-22 PROCEDURE — 2140000000 HC CCU INTERMEDIATE R&B

## 2021-10-22 PROCEDURE — 6370000000 HC RX 637 (ALT 250 FOR IP): Performed by: FAMILY MEDICINE

## 2021-10-22 PROCEDURE — 93005 ELECTROCARDIOGRAM TRACING: CPT | Performed by: INTERNAL MEDICINE

## 2021-10-22 PROCEDURE — 2580000003 HC RX 258: Performed by: INTERNAL MEDICINE

## 2021-10-22 PROCEDURE — 92928 PRQ TCAT PLMT NTRAC ST 1 LES: CPT | Performed by: INTERNAL MEDICINE

## 2021-10-22 PROCEDURE — 6360000002 HC RX W HCPCS: Performed by: INTERNAL MEDICINE

## 2021-10-22 PROCEDURE — 86850 RBC ANTIBODY SCREEN: CPT

## 2021-10-22 PROCEDURE — 36415 COLL VENOUS BLD VENIPUNCTURE: CPT

## 2021-10-22 PROCEDURE — 86901 BLOOD TYPING SEROLOGIC RH(D): CPT

## 2021-10-22 PROCEDURE — 85025 COMPLETE CBC W/AUTO DIFF WBC: CPT

## 2021-10-22 PROCEDURE — 027034Z DILATION OF CORONARY ARTERY, ONE ARTERY WITH DRUG-ELUTING INTRALUMINAL DEVICE, PERCUTANEOUS APPROACH: ICD-10-PCS | Performed by: INTERNAL MEDICINE

## 2021-10-22 PROCEDURE — C1769 GUIDE WIRE: HCPCS

## 2021-10-22 PROCEDURE — 6360000002 HC RX W HCPCS

## 2021-10-22 PROCEDURE — 85347 COAGULATION TIME ACTIVATED: CPT

## 2021-10-22 PROCEDURE — 80048 BASIC METABOLIC PNL TOTAL CA: CPT

## 2021-10-22 PROCEDURE — C1887 CATHETER, GUIDING: HCPCS

## 2021-10-22 PROCEDURE — 2709999900 HC NON-CHARGEABLE SUPPLY

## 2021-10-22 PROCEDURE — C1725 CATH, TRANSLUMIN NON-LASER: HCPCS

## 2021-10-22 PROCEDURE — 6370000000 HC RX 637 (ALT 250 FOR IP): Performed by: INTERNAL MEDICINE

## 2021-10-22 PROCEDURE — C1874 STENT, COATED/COV W/DEL SYS: HCPCS

## 2021-10-22 PROCEDURE — C1894 INTRO/SHEATH, NON-LASER: HCPCS

## 2021-10-22 RX ORDER — SODIUM CHLORIDE 9 MG/ML
INJECTION, SOLUTION INTRAVENOUS CONTINUOUS
Status: DISCONTINUED | OUTPATIENT
Start: 2021-10-22 | End: 2021-10-23 | Stop reason: HOSPADM

## 2021-10-22 RX ORDER — ATORVASTATIN CALCIUM 40 MG/1
40 TABLET, FILM COATED ORAL NIGHTLY
Status: DISCONTINUED | OUTPATIENT
Start: 2021-10-22 | End: 2021-10-23 | Stop reason: HOSPADM

## 2021-10-22 RX ORDER — MORPHINE SULFATE 2 MG/ML
2 INJECTION, SOLUTION INTRAMUSCULAR; INTRAVENOUS
Status: DISCONTINUED | OUTPATIENT
Start: 2021-10-22 | End: 2021-10-23 | Stop reason: HOSPADM

## 2021-10-22 RX ORDER — SODIUM CHLORIDE 9 MG/ML
25 INJECTION, SOLUTION INTRAVENOUS PRN
Status: DISCONTINUED | OUTPATIENT
Start: 2021-10-22 | End: 2021-10-23 | Stop reason: HOSPADM

## 2021-10-22 RX ORDER — NITROGLYCERIN 0.4 MG/1
0.4 TABLET SUBLINGUAL EVERY 5 MIN PRN
Status: DISCONTINUED | OUTPATIENT
Start: 2021-10-22 | End: 2021-10-23 | Stop reason: HOSPADM

## 2021-10-22 RX ORDER — ASPIRIN 81 MG/1
81 TABLET ORAL DAILY
Status: DISCONTINUED | OUTPATIENT
Start: 2021-10-22 | End: 2021-10-23 | Stop reason: HOSPADM

## 2021-10-22 RX ORDER — METOPROLOL SUCCINATE 25 MG/1
25 TABLET, EXTENDED RELEASE ORAL DAILY
Status: DISCONTINUED | OUTPATIENT
Start: 2021-10-23 | End: 2021-10-23 | Stop reason: HOSPADM

## 2021-10-22 RX ORDER — MIDAZOLAM HYDROCHLORIDE 2 MG/2ML
1 INJECTION, SOLUTION INTRAMUSCULAR; INTRAVENOUS ONCE
Status: COMPLETED | OUTPATIENT
Start: 2021-10-22 | End: 2021-10-22

## 2021-10-22 RX ORDER — DIPHENHYDRAMINE HYDROCHLORIDE 50 MG/ML
50 INJECTION INTRAMUSCULAR; INTRAVENOUS ONCE
Status: COMPLETED | OUTPATIENT
Start: 2021-10-22 | End: 2021-10-22

## 2021-10-22 RX ORDER — SODIUM CHLORIDE 0.9 % (FLUSH) 0.9 %
5-40 SYRINGE (ML) INJECTION EVERY 12 HOURS SCHEDULED
Status: DISCONTINUED | OUTPATIENT
Start: 2021-10-22 | End: 2021-10-23 | Stop reason: HOSPADM

## 2021-10-22 RX ORDER — SODIUM CHLORIDE 9 MG/ML
INJECTION, SOLUTION INTRAVENOUS CONTINUOUS
Status: ACTIVE | OUTPATIENT
Start: 2021-10-22 | End: 2021-10-23

## 2021-10-22 RX ORDER — ACETAMINOPHEN 325 MG/1
650 TABLET ORAL EVERY 4 HOURS PRN
Status: DISCONTINUED | OUTPATIENT
Start: 2021-10-22 | End: 2021-10-23 | Stop reason: HOSPADM

## 2021-10-22 RX ORDER — PANTOPRAZOLE SODIUM 40 MG/1
40 TABLET, DELAYED RELEASE ORAL
Status: DISCONTINUED | OUTPATIENT
Start: 2021-10-23 | End: 2021-10-23 | Stop reason: HOSPADM

## 2021-10-22 RX ORDER — SODIUM CHLORIDE 0.9 % (FLUSH) 0.9 %
5-40 SYRINGE (ML) INJECTION PRN
Status: DISCONTINUED | OUTPATIENT
Start: 2021-10-22 | End: 2021-10-23 | Stop reason: HOSPADM

## 2021-10-22 RX ADMIN — SODIUM CHLORIDE: 9 INJECTION, SOLUTION INTRAVENOUS at 08:35

## 2021-10-22 RX ADMIN — DIPHENHYDRAMINE HYDROCHLORIDE 50 MG: 50 INJECTION, SOLUTION INTRAMUSCULAR; INTRAVENOUS at 09:18

## 2021-10-22 RX ADMIN — SODIUM CHLORIDE: 9 INJECTION, SOLUTION INTRAVENOUS at 13:05

## 2021-10-22 RX ADMIN — SODIUM CHLORIDE, PRESERVATIVE FREE 10 ML: 5 INJECTION INTRAVENOUS at 21:46

## 2021-10-22 RX ADMIN — ACETAMINOPHEN 650 MG: 325 TABLET ORAL at 14:10

## 2021-10-22 RX ADMIN — MIDAZOLAM 1 MG: 1 INJECTION INTRAMUSCULAR; INTRAVENOUS at 09:01

## 2021-10-22 RX ADMIN — TICAGRELOR 90 MG: 90 TABLET ORAL at 21:46

## 2021-10-22 RX ADMIN — ACETAMINOPHEN 650 MG: 325 TABLET ORAL at 21:46

## 2021-10-22 RX ADMIN — ATORVASTATIN CALCIUM 40 MG: 40 TABLET, FILM COATED ORAL at 21:45

## 2021-10-22 ASSESSMENT — PAIN SCALES - GENERAL
PAINLEVEL_OUTOF10: 4
PAINLEVEL_OUTOF10: 0
PAINLEVEL_OUTOF10: 3
PAINLEVEL_OUTOF10: 0
PAINLEVEL_OUTOF10: 0

## 2021-10-22 ASSESSMENT — PAIN DESCRIPTION - LOCATION: LOCATION: WRIST

## 2021-10-22 ASSESSMENT — PAIN DESCRIPTION - ORIENTATION: ORIENTATION: RIGHT

## 2021-10-22 ASSESSMENT — PAIN DESCRIPTION - PAIN TYPE: TYPE: ACUTE PAIN

## 2021-10-22 NOTE — PROCEDURES
[address]                  [address] [facility],  [facility]                            CARDIAC CATHETERIZATION    PATIENT NAME: Sylvester Fleischer                 :        1980  MED REC NO:   [id number]                            ROOM:       88  ACCOUNT NO:   [de-identified]                           ADMIT DATE: 10/22/2021  PROVIDER:     Diane Bustamante MD    DATE OF PROCEDURE:  10/22/2021    PROCEDURES:  1. Percutaneous coronary intervention with deployment of 2.25 x 33 mm  Xience Mikki drug-eluting stent in ostial and proximal RCA. 2.  Conscious sedation using Versed and fentanyl. Indication #16, score of 7. INDICATIONS FOR PROCEDURE:  The patient is a 27-year-old male with  history of recent non-ST elevation MI, status post PCI to LAD, was found  also to have significant disease involving the ostial and proximal RCA. The patient was brought today for staged PCI to RCA. DESCRIPTION OF THE PROCEDURE:  After the appropriate informed consent,  the right wrist area and both the groins were prepped and draped in the  usual sterile fashion. A timeout was completed. The right wrist area  was locally anesthetized with 2 mL of 2% lidocaine. The right radial  artery was accessed several times using 21-gauge needle; however, we  were not able to advance the wire. After few attempts, the right radial  approach was abandoned. Attention was directed to the right groin area  which was locally anesthetized with a 10 mL of 2% lidocaine. A Cook  needle was used to access the right femoral artery using real-time  ultrasound guidance. A 6-Georgian sheath was used to cannulate the right  femoral artery. A 6-Georgian JR4 guiding catheter was used to engage the  right coronary artery. A BMW wire was used to cross the lesion, a  floating wire was used to help identify the ostium of the RCA when  deploying the stent.   The lesion was predilated using 2.0 x 12 mm  balloon. The lesion was stented using 2.25 x 33 mm Xience Mikki  drug-eluting stent. That was postdilated using 2.5 x 15 mm noncompliant  balloon. Followup angiography showed 0% residual stenosis with TIMI3  flow distally. At the end of the procedure, the catheter and the wire were pulled out  from the body. The sheath was sutured in place to be pulled out in CSU. COMPLICATIONS:  None. ESTIMATED TOTAL BLOOD LOSS:  20-30 mL. MEDICATIONS USED DURING THE PROCEDURE:  We used intravenous heparin for  anticoagulation. Of note, the patient is on aspirin and Brilinta. CONSCIOUS SEDATION:  We used Versed and fentanyl for conscious sedation. First dose was given at [phone], procedure ended at 1102. There were 65  minutes of direct face-to-face supervision during conscious sedation  administration. Total contrast used was 140 mL of Isovue. Total fluoroscopy time was 11.2 minutes. IMPRESSION:  1. Significant disease involving ostial and proximal RCA with  successful deployment of 2.25 x 33 mm Xience Mikki drug-eluting stent  (Pre-PCI TIMI3 flow, post-PCI TIMI3 flow, pre-PCI stenosis was 70 to 80%  with dampening of pressure on engagement, post-PCI stenosis was 0%). RECOMMENDATIONS:  1. Aspirin for life. 2.  P2Y12 inhibitors for at least a year, preferably longer. 3.  Aggressive coronary artery disease risk factor modification. 4.  The patient will be admitted for overnight observation and IV  hydration.         West Gagnon MD    D: 10/22/2021 11:33:54       T: 10/22/2021 12:10:52     [email or web address]  Job#: [id number]     Doc#: [id number]    CC:  Alida Tamez

## 2021-10-22 NOTE — H&P
Hospitalist History & Physical      PCP: No primary care provider on file. Date of Admission: 10/22/2021    Date of Service: Pt seen/examined on 10/22/2021 and is admitted to Inpatient with expected LOS greater than two midnights due to medical therapy. Chief Complaint:  had no chief complaint listed for this encounter. History Of Present Illness:    Mr. Uriel Kumar is a 37y.o. year old male  who  has a past medical history of CAD in native artery, Hyperlipidemia, Hypertension, Kidney stone, and Kidney stone. He has a recent history of NSTEMI in September 2021. He had a cardiac cath at that time which revealed significant disease involving the LAD and RCA. PCI with KARYNA x2 to the LAD was performed at that time. He returned today, 10/22/2021, for staged PCI to the RCA. He was admitted postprocedure. Past Medical History:        Diagnosis Date    CAD in native artery 9/17/2021    Hyperlipidemia     Hypertension     Kidney stone     Kidney stone        Past Surgical History:        Procedure Laterality Date    CORONARY ANGIOPLASTY WITH STENT PLACEMENT  09/17/2021    Dr Dinaa Baeza - LAD Ryan x 2 Prox to mid    HERNIA REPAIR      TONSILLECTOMY         Medications Prior to Admission:      Prior to Admission medications    Medication Sig Start Date End Date Taking?  Authorizing Provider   aspirin 81 MG EC tablet Take 1 tablet by mouth daily 9/19/21  Yes Kimberly Fregoso MD   atorvastatin (LIPITOR) 40 MG tablet Take 1 tablet by mouth nightly 9/18/21  Yes Kimberly Fregoso MD   metoprolol succinate (TOPROL XL) 25 MG extended release tablet Take 1 tablet by mouth daily 9/19/21  Yes Kimberly Fregoso MD   ticagrelor (BRILINTA) 90 MG TABS tablet Take 1 tablet by mouth 2 times daily 9/18/21  Yes Kimberly Fregoso MD   omeprazole (PRILOSEC) 40 MG delayed release capsule TAKE ONE CAPSULE BY MOUTH EVERY DAY before a meal 8/18/21  Yes Historical Provider, MD   nitroGLYCERIN (NITROSTAT) 0.4 MG SL tablet up to max of 3 total doses. If no relief after 1 dose, call 911. 9/18/21   Alexsander Flannery MD       Allergies:  Vicodin [hydrocodone-acetaminophen]    Social History:    RESIDENCE: Private residence  TOBACCO:   reports that he has quit smoking. His smoking use included cigarettes. He smoked 0.50 packs per day. He has never used smokeless tobacco.  ETOH:   reports no history of alcohol use. Family History:    As follows:  History reviewed. No pertinent family history. REVIEW OF SYSTEMS:   Pertinent positives as noted in the HPI. All other systems reviewed and negative. PHYSICAL EXAM:  BP (!) 135/90   Pulse 78   Temp 97.5 °F (36.4 °C) (Tympanic)   Resp 18   Ht 5' 11\" (1.803 m)   Wt 165 lb (74.8 kg)   SpO2 98%   BMI 23.01 kg/m²   General appearance: No apparent distress, appears stated age and cooperative. HEENT: Normal cephalic, atraumatic without obvious deformity. Pupils equal, round, and reactive to light. Neck: Supple, with full range of motion. No jugular venous distention. Trachea midline. Respiratory:  Clear to auscultation bilaterally. No apparent distress. Cardiovascular:  Regular rate and rhythm. S1, S2 without murmurs, rubs, or gallops. PV: Brisk capillary refill. +2 pedal and radial pulses bilaterally. No clubbing, cyanosis, edema of bilateral lower extremities. Abdomen: Soft, non-tender, non-distended. +BS  Musculoskeletal: No obvious deformities or erythematous or edematous joints. Skin: Normal skin color. No rashes or lesions. Neurologic:  Neurovascularly intact without any focal sensory/motor deficits.   Psychiatric: Calm and cooperative        CBC:   Recent Labs     10/22/21  0810   WBC 7.1   RBC 4.94   HGB 14.6   HCT 42.8   MCV 86.6   RDW 13.2        BMP:   Recent Labs     10/22/21  0810      K 3.8      CO2 23   BUN 12   CREATININE 0.9     LFT:  No results for input(s): PROT, ALB, ALKPHOS, ALT, AST, BILITOT, AMYLASE, LIPASE in the last 72 hours.  CE:  No results for input(s): Angelasherley Jones in the last 72 hours. PT/INR: No results for input(s): INR, APTT in the last 72 hours. BNP: No results for input(s): BNP in the last 72 hours. ESR: No results found for: SEDRATE  CRP: No results found for: CRP  D Dimer: No results found for: DDIMER   Folate and B12: No results found for: [id number], No results found for: FOLATE  Lactic Acid: No results found for: LACTA  Thyroid Studies:   Lab Results   Component Value Date    TSH 1.49 09/17/2021       Oupatient labs:  Lab Results   Component Value Date    CHOL 202 (H) 09/17/2021    TRIG 84 09/17/2021    HDL 35 09/17/2021    TSH 1.49 09/17/2021       Urinalysis:    Lab Results   Component Value Date    NITRU Negative 06/06/2018    WBCUA NEGATIVE 09/16/2021    BACTERIA NONE 06/06/2018    RBCUA NEGATIVE 09/16/2021    BLOODU Negative 06/06/2018    SPECGRAV 1.025 06/06/2018    GLUCOSEU NEGATIVE 09/16/2021       Imaging:  No results found. ASSESSMENT/PLAN:    CAD-recent NSTEMI, s/p cardiac cath with KARYNA x2 to LAD on 9/17/2021. Returned today for staged PCI to the RCA with KARYNA x1. To continue with DAPT (aspirin and Brilinta) per cardiology.     Hypertension-continue metoprolol succinate    Hyperlipidemia-continue statin      Diet: No diet orders on file  Code Status: Prior  Surrogate decision maker confirmed with patient:   Extended Emergency Contact Information  Primary Emergency Contact: [address] Phone: [phone]  Relation: Brother/Sister  Secondary Emergency Contact: [address], [address] [place] [address] Phone: [phone]  Relation: Domestic Partner      Disposition: []Med/Surg [x] Intermediate [] ICU/CCU  Admit status: [] Observation [x] Inpatient     +++++++++++++++++++++++++++++++++++++++++++++++++  [address] [place], [place]  +++++++++++++++++++++++++++++++++++++++++++++++++  NOTE: This report was transcribed using voice recognition software. Every effort was made to ensure accuracy; however, inadvertent computerized transcription errors may be present.

## 2021-10-22 NOTE — CONSULTS
2 [facility]    Name: Beuford Gaucher    Age: 37 y.o. Date of Admission: 10/22/2021 11:09 AM    Date of Service: 10/22/2021    Reason for Consultation: CAD    Chief Complaint: same    Referring Physician: Dr Ralf Brown    History of Present Illness: The patient is a 37y.o. year old male who is sp  Staged RCA ABELARDO. Recnt nstemi and LAD abelardo    Past Medical History:   Past Medical History:   Diagnosis Date    CAD in native artery 9/17/2021    Hyperlipidemia     Hypertension     Kidney stone     Kidney stone        Past Surgical History:  Past Surgical History:   Procedure Laterality Date    CORONARY ANGIOPLASTY WITH STENT PLACEMENT  09/17/2021    Dr Ralf Brown - LAD Ryan x 2 Prox to mid    HERNIA REPAIR      TONSILLECTOMY         Family History:  History reviewed. No pertinent family history. Social History:  Social History     Socioeconomic History    Marital status: Single     Spouse name: Not on file    Number of children: Not on file    Years of education: Not on file    Highest education level: Not on file   Occupational History    Not on file   Tobacco Use    Smoking status: Former Smoker     Packs/day: 0.50     Types: Cigarettes    Smokeless tobacco: Never Used   Substance and Sexual Activity    Alcohol use: No    Drug use: No    Sexual activity: Not on file   Other Topics Concern    Not on file   Social History Narrative    Not on file     Social Determinants of Health     Financial Resource Strain:     Difficulty of Paying Living Expenses:    Food Insecurity:     Worried About [address] in the Last Year:     [facility] in the Last Year:    Transportation Needs:     Lack of Transportation (Medical):      Lack of Transportation (Non-Medical):    Physical Activity:     Days of Exercise per Week:     Minutes of Exercise per Session:    Stress:     Feeling of Stress :    Social Connections:     Frequency of Communication with Friends and Family:     Frequency of Social Gatherings with Friends and Family:     Attends Confucianism Services:     Active Member of Clubs or Organizations:     Attends Club or Organization Meetings:     Marital Status:    Intimate Partner Violence:     Fear of Current or Ex-Partner:     Emotionally Abused:     Physically Abused:     Sexually Abused: Allergies: Allergies   Allergen Reactions    Vicodin [Hydrocodone-Acetaminophen] Itching       Home Meds:  Prior to Admission medications    Medication Sig Start Date End Date Taking? Authorizing Provider   aspirin 81 MG EC tablet Take 1 tablet by mouth daily 9/19/21  Yes Carl Brown MD   atorvastatin (LIPITOR) 40 MG tablet Take 1 tablet by mouth nightly 9/18/21  Yes Carl Brown MD   metoprolol succinate (TOPROL XL) 25 MG extended release tablet Take 1 tablet by mouth daily 9/19/21  Yes Carl Brown MD   ticagrelor (BRILINTA) 90 MG TABS tablet Take 1 tablet by mouth 2 times daily 9/18/21  Yes Carl Brown MD   omeprazole (PRILOSEC) 40 MG delayed release capsule TAKE ONE CAPSULE BY MOUTH EVERY DAY before a meal 8/18/21  Yes Historical Provider, MD   nitroGLYCERIN (NITROSTAT) 0.4 MG SL tablet up to max of 3 total doses.  If no relief after 1 dose, call 911. 9/18/21   Carl Brown MD       Current Medications:  Current Facility-Administered Medications   Medication Dose Route Frequency Provider Last Rate Last Admin    0.9 % sodium chloride infusion   IntraVENous Continuous Pravin Qureshi MD 20 mL/hr at 10/22/21 0835 New Bag at 10/22/21 0835    0.9 % sodium chloride infusion   IntraVENous Continuous Pravin Qureshi MD 20 mL/hr at 10/22/21 0835 New Bag at 10/22/21 0835    sodium chloride flush 0.9 % injection 5-40 mL  5-40 mL IntraVENous 2 times per day Pravin Qureshi MD        sodium chloride flush 0.9 % injection 5-40 mL  5-40 mL IntraVENous PRN Pravin Qureshi MD        0.9 % sodium chloride infusion  25 mL IntraVENous PRN Olga Haro MD        0.9 % sodium chloride infusion   IntraVENous Continuous Olga Haro MD 75 mL/hr at 10/22/21 1305 New Bag at 10/22/21 1305    aspirin EC tablet 81 mg  81 mg Oral Daily Olga Haro MD        atorvastatin (LIPITOR) tablet 40 mg  40 mg Oral Nightly MD Casey De ON 10/23/2021] metoprolol succinate (TOPROL XL) extended release tablet 25 mg  25 mg Oral Daily Olga Haro MD        nitroGLYCERIN (NITROSTAT) SL tablet 0.4 mg  0.4 mg SubLINGual Q5 Min PRN MD Casey De ON 10/23/2021] pantoprazole (PROTONIX) tablet 40 mg  40 mg Oral QAM AC Olga Haro MD        morphine (PF) injection 2 mg  2 mg IntraVENous Q2H PRN Olga Haro MD        ticagrelor (BRILINTA) tablet 90 mg  90 mg Oral BID Olga Haro MD        acetaminophen (TYLENOL) tablet 650 mg  650 mg Oral Q4H PRN Valencia Pedersen MD   650 mg at 10/22/21 1410      sodium chloride 20 mL/hr at 10/22/21 8260    sodium chloride 20 mL/hr at 10/22/21 6530    sodium chloride      sodium chloride 75 mL/hr at 10/22/21 1305       Review of Systems:   Constitutional: No fever, chills, sweats  Cardiac: As per HPI  Pulmonary: No cough, wheeze, hemoptysis  HEENT: No visual disturbances or difficult swallowing  GI: No nausea, vomiting, diarrhea, abdominal pain, rectal bleeding  : No dysuria or hematuria  Endocrine: No excessive thirst, heat or cold intolerance. Musculoskeletal: No joint pain or muscle aches. No claudication  Skin: No skin breakdown or rashes  Neuro: No headache, confusion, or seizures  Psych: No depression, anxiety    Physical Exam:  /82   Pulse 66   Temp 97.1 °F (36.2 °C) (Temporal)   Resp 16   Ht 5' 11\" (1.803 m)   Wt 165 lb (74.8 kg)   SpO2 97%   BMI 23.01 kg/m²   Weight change:    Wt Readings from Last 3 Encounters:   10/22/21 165 lb (74.8 kg)   09/17/21 168 lb (76.2 kg)   09/14/21 170 lb (77.1 kg)       General: Awake, alert, oriented x3, no acute distress    HEENT: Normocephalic and atraumatic, pupils equal and round. Sclera nonicteric and oral mucosa moist.  Tongue and trachea midline. Neck: No JVD or bruits. No thyroid enlargement or adenopathy    Cardiac: Regular rate and rhythm, normal S1 and S2, no S3. Apical impulse is normal.  No murmurs, rubs or clicks. No carotid bruits and no abdominal bruits. No peripheral edema, cyanosis or clubbing. Normal pulses in the upper and lower extremities bilaterally. Resp: Unlabored respirations at rest.  No wheezes, rales or rhonchi. Abdomen: soft, nontender, nondistended, no gross organomegaly or mass    Skin: Warm and dry, no cyanosis. Musculoskeletal: normal tone and strength in the upper and lower extremities bilaterally    Neuro: Moves all extremities appropriately to command. Normal sensation to light touch in the upper and lower extremities bilaterally    Psych: Cooperative, and normal affect    Intake/Output:  No intake or output data in the 24 hours ending 10/22/21 1457  No intake/output data recorded. Laboratory Tests:  Lab Results   Component Value Date    CREATININE 0.9 10/22/2021    BUN 12 10/22/2021     10/22/2021    K 3.8 10/22/2021     10/22/2021    CO2 23 10/22/2021     No results for input(s): CKTOTAL, CKMB, TROPONINI in the last 72 hours. No results found for: PROBNP  Lab Results   Component Value Date    WBC 7.1 10/22/2021    RBC 4.94 10/22/2021    HGB 14.6 10/22/2021    HCT 42.8 10/22/2021    MCV 86.6 10/22/2021    MCH 29.6 10/22/2021    MCHC 34.1 10/22/2021    RDW 13.2 10/22/2021     10/22/2021    MPV 11.3 10/22/2021     No results for input(s): ALKPHOS, ALT, AST, PROT, BILITOT, BILIDIR, LABALBU in the last 72 hours.   Lab Results   Component Value Date    MG 1.6 09/17/2021     No results found for: PROTIME, INR  Lab Results   Component Value Date    TSH 1.49 09/17/2021     No components found for: CHLPL  Lab Results   Component Value Date    TRIG 84 09/17/2021     Lab Results   Component Value Date    HDL 35 09/17/2021     No results found for: [facility] Problems    Diagnosis     S/P coronary artery stent placement [Z95.5]     Encounter for long-term (current) use of antiplatelets/antithrombotics [Z79.02]     Other hyperlipidemia [E78.49]     CAD in native artery [I25.10]              ASSESSMENT / PLAN:  1. CAD and LAD , RCA KARYNA.   DAPT  and statin          Electronically signed by Peterson Denise MD on 10/22/2021 at 2:57 PM

## 2021-10-22 NOTE — PATIENT CARE CONFERENCE
P Quality Flow/Interdisciplinary Rounds Progress Note        Quality Flow Rounds held on October 22, 2021    Disciplines Attending:  Bedside Nurse, ,  and Nursing Unit Leadership    Debby Carvalho was admitted on 10/22/2021 11:09 AM    Anticipated Discharge Date:  Expected Discharge Date: 10/23/21    Disposition:    Travon Score:  Travon Scale Score: 21    Readmission Risk              Risk of Unplanned Readmission:  9           Discussed patient goal for the day, patient clinical progression, and barriers to discharge. The following Goal(s) of the Day/Commitment(s) have been identified:  Patient Satisfaction  .       Mingo Singh RN  October 22, 2021

## 2021-10-22 NOTE — H&P
Department of Medicine  Section of Cardiology  Attending Procedure History and Physical        Pre-Procedural Diagnosis: CAD    Indications: CAD, significant stenosis of right coronary artery    Procedure Planned: PCI to RCA    History obtained from patient    History of Present Illness: The patient is a 37 y.o. male who presents for the above procedure. Patient had non-ST elevation MI in September 2021 however, had cardiac catheterization which revealed significant disease involving the LAD and the RCA, patient had PCI to LAD then, patient is here for staged PCI to RCA today. Past Medical History:        Diagnosis Date    CAD in native artery 9/17/2021    Hyperlipidemia     Hypertension     Kidney stone     Kidney stone          Past Surgical History:        Procedure Laterality Date    CORONARY ANGIOPLASTY WITH STENT PLACEMENT  09/17/2021    Dr Josefina Plata - LAD Ryan x 2 Prox to mid    HERNIA REPAIR      TONSILLECTOMY       Medications:    Current Outpatient Rx   Medication Sig Dispense Refill    aspirin 81 MG EC tablet Take 1 tablet by mouth daily 30 tablet 0    atorvastatin (LIPITOR) 40 MG tablet Take 1 tablet by mouth nightly 30 tablet 0    metoprolol succinate (TOPROL XL) 25 MG extended release tablet Take 1 tablet by mouth daily 30 tablet 0    ticagrelor (BRILINTA) 90 MG TABS tablet Take 1 tablet by mouth 2 times daily 60 tablet 0    omeprazole (PRILOSEC) 40 MG delayed release capsule TAKE ONE CAPSULE BY MOUTH EVERY DAY before a meal      nitroGLYCERIN (NITROSTAT) 0.4 MG SL tablet up to max of 3 total doses.  If no relief after 1 dose, call 911. 25 tablet 0       Allergies:  Vicodin [hydrocodone-acetaminophen]    History of allergic reaction to anesthesia:  no      REVIEW OF SYSTEMS  CONSTITUTIONAL:  negative for  fevers, chills  HEENT:  negative for earaches, nasal congestion and epistaxis  RESPIRATORY:  negative for  dry cough, cough with sputum,wheezing and hemoptysis  GASTROINTESTINAL:  negative for nausea, vomiting  MUSCULOSKELETAL:  negative for  myalgias, arthralgias  NEUROLOGICAL:  negative for visual disturbance, dysphagia    PHYSICAL EXAM    BP (!) 135/90   Pulse 78   Temp 97.5 °F (36.4 °C) (Tympanic)   Resp 18   Ht 5' 11\" (1.803 m)   Wt 165 lb (74.8 kg)   SpO2 98%   BMI 23.01 kg/m²     CONSTITUTIONAL:  awake, alert, cooperative, no apparent distress, and appears stated age  HEAD:  normocepalic, without obvious abnormality, atraumatic  NECK:  Supple, symmetrical, trachea midline, no adenopathy, thyroid symmetric, not enlarged and no tenderness, skin normal  LUNGS:  No increased work of breathing, good air exchange, clear to auscultation bilaterally, no crackles or wheezing  CARDIOVASCULAR:  Normal apical impulse, regular rate and rhythm, normal S1 and S2, no S3 or S4, and no murmur noted, no edema, no JVD, no carotid bruit. ABDOMEN:  Soft, nontender, no masses, no hepatomegaly, no splenomegaly, BS+  MUSCULOSKELETAL:  No clubbing no cyanosis. there is no redness, warmth, or swelling of the joints  full range of motion noted  NEUROLOGIC:  Alert, awake,oriented x3  SKIN:  no bruising or bleeding, normal skin color, texture, turgor and no redness, warmth, or swelling    DATA    CBC:    Lab Results   Component Value Date    WBC 13.3 09/18/2021    RBC 4.78 09/18/2021    HGB 14.2 09/18/2021    HCT 42.5 09/18/2021    MCV 88.9 09/18/2021    RDW 12.9 09/18/2021     09/18/2021     Platelet:    Lab Results   Component Value Date     09/18/2021     BUN/Cr:    Lab Results   Component Value Date    BUN 11 09/18/2021     Potassium:    Lab Results   Component Value Date    K 4.1 09/18/2021     PT/INR:  No results found for: PTINR  PTT:  No results found for: APTT      ASSESSMENT AND PLAN    1.   Patient is a 37 y.o. male with above specified procedure planned cardiac catheterization under conscious sedation    Expected Sedation/Anesthesia Type:  conscious sedation    2. ASA ([address] Anesthesiology) Anesthesia Status: 2    3. Procedure options, risks and benefits reviewed with Patient. Patient expresses understanding    4. Patient has not been on anticoagulation medications    5. Consent has been signed:   Yes

## 2021-10-23 VITALS
HEIGHT: 71 IN | OXYGEN SATURATION: 99 % | DIASTOLIC BLOOD PRESSURE: 78 MMHG | RESPIRATION RATE: 18 BRPM | TEMPERATURE: 97.6 F | HEART RATE: 69 BPM | BODY MASS INDEX: 23.1 KG/M2 | WEIGHT: 165 LBS | SYSTOLIC BLOOD PRESSURE: 131 MMHG

## 2021-10-23 LAB
ANION GAP SERPL CALCULATED.3IONS-SCNC: 9 MMOL/L (ref 7–16)
BUN BLDV-MCNC: 9 MG/DL (ref 6–20)
CALCIUM SERPL-MCNC: 8.5 MG/DL (ref 8.6–10.2)
CHLORIDE BLD-SCNC: 104 MMOL/L (ref 98–107)
CO2: 23 MMOL/L (ref 22–29)
CREAT SERPL-MCNC: 0.9 MG/DL (ref 0.7–1.2)
EKG ATRIAL RATE: 56 BPM
EKG P AXIS: 9 DEGREES
EKG P-R INTERVAL: 140 MS
EKG Q-T INTERVAL: 436 MS
EKG QRS DURATION: 98 MS
EKG QTC CALCULATION (BAZETT): 420 MS
EKG R AXIS: 75 DEGREES
EKG T AXIS: 65 DEGREES
EKG VENTRICULAR RATE: 56 BPM
GFR AFRICAN AMERICAN: >60
GFR NON-AFRICAN AMERICAN: >60 ML/MIN/1.73
GLUCOSE BLD-MCNC: 90 MG/DL (ref 74–99)
HCT VFR BLD CALC: 37.7 % (ref 37–54)
HEMOGLOBIN: 12.8 G/DL (ref 12.5–16.5)
MCH RBC QN AUTO: 29.6 PG (ref 26–35)
MCHC RBC AUTO-ENTMCNC: 34 % (ref 32–34.5)
MCV RBC AUTO: 87.3 FL (ref 80–99.9)
PDW BLD-RTO: 13.5 FL (ref 11.5–15)
PLATELET # BLD: 135 E9/L (ref 130–450)
PMV BLD AUTO: 11.5 FL (ref 7–12)
POTASSIUM SERPL-SCNC: 4.2 MMOL/L (ref 3.5–5)
RBC # BLD: 4.32 E12/L (ref 3.8–5.8)
SODIUM BLD-SCNC: 136 MMOL/L (ref 132–146)
WBC # BLD: 6.5 E9/L (ref 4.5–11.5)

## 2021-10-23 PROCEDURE — 80048 BASIC METABOLIC PNL TOTAL CA: CPT

## 2021-10-23 PROCEDURE — 6370000000 HC RX 637 (ALT 250 FOR IP): Performed by: INTERNAL MEDICINE

## 2021-10-23 PROCEDURE — 2580000003 HC RX 258: Performed by: INTERNAL MEDICINE

## 2021-10-23 PROCEDURE — 85027 COMPLETE CBC AUTOMATED: CPT

## 2021-10-23 PROCEDURE — 36415 COLL VENOUS BLD VENIPUNCTURE: CPT

## 2021-10-23 PROCEDURE — 93010 ELECTROCARDIOGRAM REPORT: CPT | Performed by: INTERNAL MEDICINE

## 2021-10-23 RX ADMIN — METOPROLOL SUCCINATE 25 MG: 25 TABLET, EXTENDED RELEASE ORAL at 08:35

## 2021-10-23 RX ADMIN — TICAGRELOR 90 MG: 90 TABLET ORAL at 08:35

## 2021-10-23 RX ADMIN — ASPIRIN 81 MG: 81 TABLET, COATED ORAL at 08:34

## 2021-10-23 RX ADMIN — SODIUM CHLORIDE, PRESERVATIVE FREE 10 ML: 5 INJECTION INTRAVENOUS at 08:35

## 2021-10-23 RX ADMIN — PANTOPRAZOLE SODIUM 40 MG: 40 TABLET, DELAYED RELEASE ORAL at 08:34

## 2021-10-23 ASSESSMENT — PAIN SCALES - GENERAL: PAINLEVEL_OUTOF10: 0

## 2021-10-23 NOTE — CONSULTS
Met with patient and discussed that their physician has ordered a referral to our outpatient Phase II Cardiac Rehabilitation program. Reviewed the benefits of cardiac rehabilitation based on their diagnosis and personal risk factors. Patient demonstrates mild interest in Cardiac Rehabilitation at this time. Cardiac Rehabilitation brochure provided to patient/family. The Cardiac Rehabilitation Program has been provided the patient's referral information and pertinent patient details and history. The patient may call [facility] Jose Garretson at [phone] for additional information or questions. Contact information for [facility] and other choices close to the patient's residence have been provided in the discharge instructions so that the patient may call and schedule an appointment when cleared by their physician.  Thank you for the referral.

## 2021-10-23 NOTE — DISCHARGE SUMMARY
Hospitalist Discharge Summary    Patient ID: Pascale Og   Patient : 1980  Patient's PCP: No primary care provider on file. Admit Date: 10/22/2021   Admitting Physician: Timmy Griffin MD    Discharge Date:  10/23/2021  Discharge Physician: AMRIT Jarvis - CNP   Discharge Condition: Stable  Discharge Disposition: Home    History of presenting illness/hospital course:    Mr. Pascale Og is a 37y.o. year old male  who  has a past medical history of CAD in native artery, Hyperlipidemia, Hypertension, Kidney stone, and Kidney stone.      He has a recent history of NSTEMI in 2021. He had a cardiac cath at that time which revealed significant disease involving the LAD and RCA. PCI with KARYNA x2 to the LAD was performed at that time. He returned today, 10/22/2021, for staged PCI to the RCA.     He was admitted postprocedure and remained stable overnight. He reports feeling well today with no events overnight. Denies chest pain, SOB. Stable for discharge. Consults:   IP CONSULT TO CARDIAC REHAB  IP CONSULT TO CARDIOLOGY    Discharge Diagnoses:    CAD-recent NSTEMI, s/p cardiac cath with KARYNA x2 to LAD on 2021. Returned today for staged PCI to the RCA with KARYNA x1. To continue with DAPT (aspirin and Brilinta) per cardiology.     Hypertension-continue metoprolol succinate     Hyperlipidemia-continue statin    Discharge Instructions / Follow up:    Medications as prescribed. Follow up with PCP and cardiology within 2-4 weeks.       Activity: activity as tolerated    Significant labs:  CBC:   Recent Labs     10/22/21  0810 10/23/21  0559   WBC 7.1 6.5   RBC 4.94 4.32   HGB 14.6 12.8   HCT 42.8 37.7   MCV 86.6 87.3   RDW 13.2 13.5    135     BMP:   Recent Labs     10/22/21  0810 10/23/21  0559    136   K 3.8 4.2    104   CO2 23 23   BUN 12 9   CREATININE 0.9 0.9     LFT:  No results for input(s): PROT, ALB, ALKPHOS, ALT, AST, BILITOT, AMYLASE, LIPASE in the last 72 hours. PT/INR: No results for input(s): INR, APTT in the last 72 hours. BNP: No results for input(s): BNP in the last 72 hours. Hgb A1C: No results found for: LABA1C  Folate and B12: No results found for: [id number], No results found for: FOLATE  Thyroid Studies:   Lab Results   Component Value Date    TSH 1.49 2021       Urinalysis:    Lab Results   Component Value Date    NITRU Negative 2018    WBCUA NEGATIVE 2021    BACTERIA NONE 2018    RBCUA NEGATIVE 2021    BLOODU Negative 2018    SPECGRAV 1.025 2018    GLUCOSEU NEGATIVE 2021       Imaging:  US Pseudoaneurysm    Result Date: 2021                                      [address] [place], [address] [place] [address]                                              [phone]                                             Ultrasound Report                                                  Signed    Patient: Paluine Kearney                                                                MR#: [id number] Paci  [id number]          : 1980                                                                [de-identified]            Age/Sex: 37 / M                                                                Admit Date: 21            Loc: [facility]                                                                                    Attending Dr: Martha Michaud MD    Ordering Physician: Piedad Melton MD   Date of Service: 21  Procedure(s): US groin pseudo aneurysm  Accession Number(s): [id number]    cc: Piedad Melton MD      STAT REPORT   Report was called to Dr. Cristóbal Hager office / Pratibha Moseley, [address] [place] [address], given report at 46, 21. Report   faxed per request.       REPORT by Danni Maya MD (21  [phone])       HISTORY:  Recent catheterization with possible pseudoaneurysm. TECHNIQUE:  Ultrasound, right groin. FINDINGS:  There is a 4.2 x 1.0 cm hematoma in the subcutaneous tissues of the right groin with no   active bleeding. Underlying artery and vein appear patent. IMPRESSION:   Ultrasound groin:   1.  4.2 x 1 cm hematoma in the subcutaneous tissues. No active bleeding. Dictated ByJuarez Velazquez MD   DD/DT: 09/27/21 1034               Signed By: Chryl Schilder MD 09/27/21 1118            :      Discharge Medications:      Medication List      ASK your doctor about these medications    aspirin 81 MG EC tablet  Take 1 tablet by mouth daily     atorvastatin 40 MG tablet  Commonly known as: LIPITOR  Take 1 tablet by mouth nightly     metoprolol succinate 25 MG extended release tablet  Commonly known as: TOPROL XL  Take 1 tablet by mouth daily     nitroGLYCERIN 0.4 MG SL tablet  Commonly known as: NITROSTAT  up to max of 3 total doses. If no relief after 1 dose, call 911. omeprazole 40 MG delayed release capsule  Commonly known as: PRILOSEC     ticagrelor 90 MG Tabs tablet  Commonly known as: BRILINTA  Take 1 tablet by mouth 2 times daily            Time Spent on discharge is more than 35 minutes in the examination, evaluation, counseling and review of medications and discharge plan.    +++++++++++++++++++++++++++++++++++++++++++++++++  Joycelyn Hernandez, [address] [place] [address]  +++++++++++++++++++++++++++++++++++++++++++++++++  NOTE: This report was transcribed using voice recognition software. Every effort was made to ensure accuracy; however, inadvertent computerized transcription errors may be present.

## 2021-10-23 NOTE — PROGRESS NOTES
[facility] [address]    Name: Janna Swanson    Age: 37 y.o. PCP: No primary care provider on file. Date of Admission: 10/22/2021 11:09 AM    Date of Service: 10/23/2021    Chief Complaint: Follow-up for post cath PCI    History of Present Illness: The patient is a 37y.o. year old male who is s/p  Staged RCA ABELARDO. Recent nstemi and LAD abelardo    Interim History:  No new overnight cardiac complaints. Currently with no complaints of CP, SOB, palpitations, dizziness, or lightheadedness. Telemetry personally reviewed and showed NSR      Review of Systems:   Cardiac: As per HPI  General: No fever, chills  Pulmonary: No cough, wheeze, or shortness of breath  GI: No nausea, vomiting,or abdominal pain  Neuro: No headache or confusion    Problem List:  Active Problems:    CAD in native artery    S/P coronary artery stent placement    Encounter for long-term (current) use of antiplatelets/antithrombotics    Other hyperlipidemia  Resolved Problems:    * No resolved hospital problems. *      Past Medical History:  Past Medical History:   Diagnosis Date    CAD in native artery 9/17/2021    Hyperlipidemia     Hypertension     Kidney stone     Kidney stone        Allergies:   Allergies   Allergen Reactions    Vicodin [Hydrocodone-Acetaminophen] Itching       Current Medications:  Current Facility-Administered Medications   Medication Dose Route Frequency Provider Last Rate Last Admin    0.9 % sodium chloride infusion   IntraVENous Continuous Maximino Osborne MD 20 mL/hr at 10/22/21 0835 New Bag at 10/22/21 0835    0.9 % sodium chloride infusion   IntraVENous Continuous Maximino Osborne MD 20 mL/hr at 10/22/21 0835 New Bag at 10/22/21 0835    sodium chloride flush 0.9 % injection 5-40 mL  5-40 mL IntraVENous 2 times per day Maximino Osborne MD   10 mL at 10/23/21 0835    sodium chloride flush 0.9 % injection 5-40 mL  5-40 mL IntraVENous PRN Maximino Osborne MD        0.9 % sodium chloride infusion  25 mL IntraVENous PRN Letty Caro MD        aspirin EC tablet 81 mg  81 mg Oral Daily Letty Caro MD   81 mg at 10/23/21 0834    atorvastatin (LIPITOR) tablet 40 mg  40 mg Oral Nightly Letty Caro MD   40 mg at 10/22/21 2145    metoprolol succinate (TOPROL XL) extended release tablet 25 mg  25 mg Oral Daily Letty Caro MD   25 mg at 10/23/21 0835    nitroGLYCERIN (NITROSTAT) SL tablet 0.4 mg  0.4 mg SubLINGual Q5 Min PRN Letty Caro MD        pantoprazole (PROTONIX) tablet 40 mg  40 mg Oral QAM AC Letty Caro MD   40 mg at 10/23/21 0834    morphine (PF) injection 2 mg  2 mg IntraVENous Q2H PRN Letty Caro MD        ticagrelor (BRILINTA) tablet 90 mg  90 mg Oral BID Letty Caro MD   90 mg at 10/23/21 0835    acetaminophen (TYLENOL) tablet 650 mg  650 mg Oral Q4H PRN Galileo Carmen MD   650 mg at 10/22/21 2146      sodium chloride 20 mL/hr at 10/22/21 0835    sodium chloride 20 mL/hr at 10/22/21 0835    sodium chloride         Physical Exam:  /78   Pulse 69   Temp 97.6 °F (36.4 °C) (Temporal)   Resp 18   Ht 5' 11\" (1.803 m)   Wt 165 lb (74.8 kg)   SpO2 99%   BMI 23.01 kg/m²   Weight change: Wt Readings from Last 3 Encounters:   10/22/21 165 lb (74.8 kg)   09/17/21 168 lb (76.2 kg)   09/14/21 170 lb (77.1 kg)     General: Awake, alert, oriented x3, no acute distress  Neck: No JVD, carotid bruits, thyromegaly, or lymphadenopathy  Cardiac: Regular rate and rhythm, normal S1 and split S2, no murmurs, no S3 or S4, no pericardial rubs. Carotid upstrokes brisk  Resp: clear bilaterally without wheezes, rhonchi, or rales; unlabored respirations  Abdomen: soft, nontender, nondistended, BS+; no masses, bruits, or hepatomegaly  Extremities: no cyanosis, clubbing, or edema.   Distal pulses intact, right wrist and groin site ok  Skin: Warm and dry, no rashes or lesions  Neuro: moves all extremities to command, no focal deficits noted    Intake/Output:    Intake/Output Summary (Last 24 hours) at 10/23/2021 1210  Last data filed at 10/23/2021 1005  Gross per 24 hour   Intake 120 ml   Output 300 ml   Net -180 ml     I/O this shift:  In: 120 [P.O.:120]  Out: -     Laboratory Tests:  Last 3 CBC:  Recent Labs     10/22/21  0810 10/23/21  0559   WBC 7.1 6.5   RBC 4.94 4.32   HGB 14.6 12.8   HCT 42.8 37.7   MCV 86.6 87.3   MCH 29.6 29.6   MCHC 34.1 34.0   RDW 13.2 13.5    135   MPV 11.3 11.5       Last 3 CMP:    Recent Labs     10/22/21  0810 10/23/21  0559    136   K 3.8 4.2    104   CO2 23 23   BUN 12 9   CREATININE 0.9 0.9   GLUCOSE 98 90   CALCIUM 9.2 8.5*       Last 3 Mag/Phos:  No results for input(s): MG, PHOS in the last 72 hours. Last 3 CK, CKMB, Troponin  No results for input(s): CKTOTAL, CKMB, TROPONINI in the last 72 hours. Last 3 BNP:  No results for input(s): BNP in the last 72 hours. No results found for: BNP    Last 3 Glucose:     Recent Labs     10/22/21  0810 10/23/21  0559   GLUCOSE 98 90       Last 3 Coags:  No results for input(s): PROTIME, INR, PTT in the last 72 hours. No results found for: PROTIME, INR, PTT    Last 3 Lipid Panel:  No results for input(s): LDLCALC, HDL, TRIG in the last 72 hours. Invalid input(s): CHLPL  No results found for: [facility]  Lab Results   Component Value Date    HDL 35 09/17/2021     Lab Results   Component Value Date    TRIG 84 09/17/2021     No components found for: CHLPL    TSH:  No results for input(s): TSH in the last 72 hours. Lab Results   Component Value Date    TSH 1.49 09/17/2021           Radiology:  No orders to display           ASSESSMENT / PLAN:  1. CAD and LAD , RCA KARYNA. DAPT with brinta and asa- has at home since this was a staged procedure,  and statin. He is otherwise stable for home no heavy lifting X5 days due to rt fem entry for cath. Mame Jackson[facility] [place] [address] for home today.  Has f/u with Dr Maxi Estevez next week.                Carolynn Todd MD, MD, [address] [place] [address] at [facility]    Electronically signed by Ghanshyam Daley MD on 10/23/2021 at 12:10 PM

## 2021-10-23 NOTE — PROGRESS NOTES
Monitor dcd cleaned and placed in slot in nurses station. post cardiac cath wrist and groin instructions reviewed with patient and handout given. info given on cardiac diet. meds reviewed with patient and the importance of taking aspirin and brilinta as ordered stressed to patient.

## 2021-10-23 NOTE — PLAN OF CARE
Problem: Cardiac:  Goal: Hemodynamic stability will improve  Description: Hemodynamic stability will improve  Outcome: Ongoing

## 2023-01-05 ENCOUNTER — OFFICE VISIT (OUTPATIENT)
Dept: FAMILY MEDICINE CLINIC | Age: 43
End: 2023-01-05

## 2023-01-05 VITALS
OXYGEN SATURATION: 98 % | BODY MASS INDEX: 23.1 KG/M2 | RESPIRATION RATE: 18 BRPM | HEIGHT: 71 IN | SYSTOLIC BLOOD PRESSURE: 126 MMHG | DIASTOLIC BLOOD PRESSURE: 74 MMHG | HEART RATE: 74 BPM | WEIGHT: 165 LBS | TEMPERATURE: 97.8 F

## 2023-01-05 DIAGNOSIS — R50.9 FEVER, UNSPECIFIED FEVER CAUSE: ICD-10-CM

## 2023-01-05 DIAGNOSIS — R05.9 COUGH, UNSPECIFIED TYPE: Primary | ICD-10-CM

## 2023-01-05 LAB
INFLUENZA A ANTIBODY: NORMAL
INFLUENZA B ANTIBODY: NORMAL
Lab: NORMAL
PERFORMING INSTRUMENT: NORMAL
QC PASS/FAIL: NORMAL
SARS-COV-2, POC: NORMAL

## 2023-01-05 PROCEDURE — 99213 OFFICE O/P EST LOW 20 MIN: CPT

## 2023-01-05 PROCEDURE — 87804 INFLUENZA ASSAY W/OPTIC: CPT

## 2023-01-05 PROCEDURE — 87426 SARSCOV CORONAVIRUS AG IA: CPT

## 2023-07-11 ENCOUNTER — HOSPITAL ENCOUNTER (EMERGENCY)
Age: 43
Discharge: HOME | End: 2023-07-11
Attending: EMERGENCY MEDICINE

## 2023-07-11 ENCOUNTER — APPOINTMENT (OUTPATIENT)
Dept: GENERAL RADIOLOGY | Age: 43
End: 2023-07-11

## 2023-07-11 VITALS
DIASTOLIC BLOOD PRESSURE: 99 MMHG | WEIGHT: 165 LBS | HEART RATE: 53 BPM | HEIGHT: 72 IN | TEMPERATURE: 98.7 F | BODY MASS INDEX: 22.35 KG/M2 | SYSTOLIC BLOOD PRESSURE: 142 MMHG | OXYGEN SATURATION: 100 % | RESPIRATION RATE: 15 BRPM

## 2023-07-11 DIAGNOSIS — R20.2 FACIAL PARESTHESIA: ICD-10-CM

## 2023-07-11 DIAGNOSIS — I10 HYPERTENSION, UNSPECIFIED TYPE: Primary | ICD-10-CM

## 2023-07-11 LAB
ALBUMIN SERPL-MCNC: 4.4 G/DL (ref 3.5–5.2)
ALP SERPL-CCNC: 65 U/L (ref 40–129)
ALT SERPL-CCNC: 25 U/L (ref 0–40)
ANION GAP SERPL CALCULATED.3IONS-SCNC: 8 MMOL/L (ref 7–16)
AST SERPL-CCNC: 25 U/L (ref 0–39)
BASOPHILS # BLD: 0.06 E9/L (ref 0–0.2)
BASOPHILS NFR BLD: 1 % (ref 0–2)
BILIRUB SERPL-MCNC: 0.5 MG/DL (ref 0–1.2)
BNP BLD-MCNC: 185 PG/ML (ref 0–125)
BUN SERPL-MCNC: 15 MG/DL (ref 6–20)
CALCIUM SERPL-MCNC: 9.3 MG/DL (ref 8.6–10.2)
CHLORIDE SERPL-SCNC: 104 MMOL/L (ref 98–107)
CO2 SERPL-SCNC: 26 MMOL/L (ref 22–29)
CREAT SERPL-MCNC: 0.9 MG/DL (ref 0.7–1.2)
EKG ATRIAL RATE: 45 BPM
EKG P AXIS: -8 DEGREES
EKG P-R INTERVAL: 140 MS
EKG Q-T INTERVAL: 452 MS
EKG QRS DURATION: 102 MS
EKG QTC CALCULATION (BAZETT): 390 MS
EKG R AXIS: 73 DEGREES
EKG T AXIS: 64 DEGREES
EKG VENTRICULAR RATE: 45 BPM
EOSINOPHIL # BLD: 0.06 E9/L (ref 0.05–0.5)
EOSINOPHIL NFR BLD: 1 % (ref 0–6)
ERYTHROCYTE [DISTWIDTH] IN BLOOD BY AUTOMATED COUNT: 12.6 FL (ref 11.5–15)
GLUCOSE SERPL-MCNC: 94 MG/DL (ref 74–99)
HCT VFR BLD AUTO: 39.8 % (ref 37–54)
HGB BLD-MCNC: 13.4 G/DL (ref 12.5–16.5)
IMM GRANULOCYTES # BLD: 0.02 E9/L
IMM GRANULOCYTES NFR BLD: 0.3 % (ref 0–5)
LYMPHOCYTES # BLD: 1.54 E9/L (ref 1.5–4)
LYMPHOCYTES NFR BLD: 26 % (ref 20–42)
MCH RBC QN AUTO: 29.6 PG (ref 26–35)
MCHC RBC AUTO-ENTMCNC: 33.7 % (ref 32–34.5)
MCV RBC AUTO: 87.9 FL (ref 80–99.9)
MONOCYTES # BLD: 0.61 E9/L (ref 0.1–0.95)
MONOCYTES NFR BLD: 10.3 % (ref 2–12)
NEUTROPHILS # BLD: 3.63 E9/L (ref 1.8–7.3)
NEUTS SEG NFR BLD: 61.4 % (ref 43–80)
PLATELET # BLD AUTO: 129 E9/L (ref 130–450)
PMV BLD AUTO: 11.9 FL (ref 7–12)
POTASSIUM SERPL-SCNC: 4.2 MMOL/L (ref 3.5–5)
PROT SERPL-MCNC: 7 G/DL (ref 6.4–8.3)
RBC # BLD AUTO: 4.53 E12/L (ref 3.8–5.8)
SODIUM SERPL-SCNC: 138 MMOL/L (ref 132–146)
TROPONIN, HIGH SENSITIVITY: <6 NG/L (ref 0–11)
WBC # BLD: 5.9 E9/L (ref 4.5–11.5)

## 2023-07-11 PROCEDURE — 71045 X-RAY EXAM CHEST 1 VIEW: CPT

## 2023-07-11 PROCEDURE — 93005 ELECTROCARDIOGRAM TRACING: CPT | Performed by: STUDENT IN AN ORGANIZED HEALTH CARE EDUCATION/TRAINING PROGRAM

## 2023-07-11 PROCEDURE — 80053 COMPREHEN METABOLIC PANEL: CPT

## 2023-07-11 PROCEDURE — 83880 ASSAY OF NATRIURETIC PEPTIDE: CPT

## 2023-07-11 PROCEDURE — 93010 ELECTROCARDIOGRAM REPORT: CPT | Performed by: INTERNAL MEDICINE

## 2023-07-11 PROCEDURE — 85025 COMPLETE CBC W/AUTO DIFF WBC: CPT

## 2023-07-11 PROCEDURE — 99285 EMERGENCY DEPT VISIT HI MDM: CPT

## 2023-07-11 PROCEDURE — 84484 ASSAY OF TROPONIN QUANT: CPT

## 2023-07-11 ASSESSMENT — PAIN - FUNCTIONAL ASSESSMENT: PAIN_FUNCTIONAL_ASSESSMENT: NONE - DENIES PAIN

## 2023-07-11 NOTE — ED TRIAGE NOTES
FIRST PROVIDER CONTACT ASSESSMENT NOTE       Department of Emergency Medicine                 First Provider Note            23  12:56 PM EDT    Date of Encounter: No admission date for patient encounter. Patient Name: Juliet Downing  : 1980  MRN: [id number]    Chief Complaint: Hypertension (Pt states he thinks his BP is high, hx of) and Numbness (Facial numbness/tingling x2 days. Pt states hes been taking percocet x1 week)      History of Present Illness:   Juliet Downing is a 44 y.o. male who presents to the ED for Patient complains of feeling like his BP is high. He has not checked his BP. His face feels numb and tingling. Denies CP. Focused Physical Exam:  VS:    ED Triage Vitals   BP Temp Temp src Pulse Resp SpO2 Height Weight   -- -- -- -- -- -- -- --        Physical Ex: Constitutional: Alert and non-toxic. Medical History:  has a past medical history of CAD in native artery, Hyperlipidemia, Hypertension, Kidney stone, and Kidney stone. Surgical History:  has a past surgical history that includes hernia repair; Tonsillectomy; and Coronary angioplasty with stent (2021). Social History:  reports that he has quit smoking. His smoking use included cigarettes. He smoked an average of .5 packs per day. He has never used smokeless tobacco. He reports that he does not drink alcohol and does not use drugs. Family History: family history is not on file.     Allergies: Vicodin [hydrocodone-acetaminophen]     Initial Plan of Care: Initiate Treatment-Testing, Proceed toTreatment Area When Bed Available for ED Attending/MLP to Continue Care      ---END OF FIRST PROVIDER CONTACT ASSESSMENT NOTE---  Electronically signed by IMAN Barros   DD: 23

## 2023-07-11 NOTE — ED PROVIDER NOTES
inadvertent computerized transcription errors may be present         Niecy Mojica DO  Resident  07/11/23 [id number]

## 2024-01-30 ENCOUNTER — OFFICE VISIT (OUTPATIENT)
Dept: FAMILY MEDICINE CLINIC | Age: 44
End: 2024-01-30

## 2024-01-30 VITALS
OXYGEN SATURATION: 98 % | BODY MASS INDEX: 24.82 KG/M2 | TEMPERATURE: 97.5 F | SYSTOLIC BLOOD PRESSURE: 122 MMHG | HEART RATE: 78 BPM | WEIGHT: 183 LBS | DIASTOLIC BLOOD PRESSURE: 80 MMHG

## 2024-01-30 DIAGNOSIS — R19.09 INGUINAL SWELLING: ICD-10-CM

## 2024-01-30 DIAGNOSIS — M25.50 ARTHRALGIA, UNSPECIFIED JOINT: ICD-10-CM

## 2024-01-30 DIAGNOSIS — R59.0 INGUINAL LYMPHADENOPATHY: Primary | ICD-10-CM

## 2024-01-30 PROCEDURE — 99214 OFFICE O/P EST MOD 30 MIN: CPT

## 2024-01-30 RX ORDER — PREDNISONE 10 MG/1
TABLET ORAL
Qty: 18 TABLET | Refills: 0 | Status: SHIPPED | OUTPATIENT
Start: 2024-01-30 | End: 2024-02-07

## 2024-01-30 RX ORDER — DOXYCYCLINE HYCLATE 100 MG/1
100 CAPSULE ORAL 2 TIMES DAILY
Qty: 20 CAPSULE | Refills: 0 | Status: SHIPPED | OUTPATIENT
Start: 2024-01-30 | End: 2024-02-09

## 2024-01-30 RX ORDER — AZITHROMYCIN 250 MG/1
TABLET, FILM COATED ORAL
Qty: 6 TABLET | Refills: 0 | Status: SHIPPED | OUTPATIENT
Start: 2024-01-30

## 2024-01-30 NOTE — PROGRESS NOTES
Chief Complaint       Groin Pain (Lymph nodes swollen)    History of Present Illness   Source of history provided by:  patient.     Ramos Rosa is a 43 y.o. old male presenting to the walk in clinic for evaluation after feeling \"joint pain all over\" starting 4 days ago.  Pain originally present in bilateral shoulders, bilateral hips, bilateral lower back, bilateral knees.  It has been intermittent, worse with ambulation, improves with sitting upright.  Continue present in the left hip and inguinal region.  Patient is concerned he has scabbed scratch fever because he has been Scratched All over His Body As per Patient.  Denies any penile discharge, dysuria, or recent illness.  Pt denies any fever, chills, abdominal pain, back pain, pain with erection, or lethargy.     ROS    Unless otherwise stated in this report or unable to obtain because of the patient's clinical or mental status as evidenced by the medical record, this patients's positive and negative responses for Review of Systems, constitutional, psych, eyes, ENT, cardiovascular, respiratory, gastrointestinal, neurological, genitourinary, musculoskeletal, integument systems and systems related to the presenting problem are either stated in the preceding or were not pertinent or were negative for the symptoms and/or complaints related to the medical problem.    Physical Exam         VS:  /80   Pulse 78   Temp 97.5 °F (36.4 °C)   Wt 83 kg (183 lb)   SpO2 98%   BMI 24.82 kg/m²    Oxygen Saturation Interpretation: Normal.    Constitutional:  Alert, development consistent with age.  Neck:  Normal ROM.  Supple.  Lungs:  Clear to auscultation and breath sounds equal.  Heart:  Regular rate and rhythm, normal heart sounds, without pathological murmurs, ectopy, gallops, or rubs.  Abdomen:  Soft, nontender, good bowel sounds.  No firm or pulsatile mass.  Small, soft, cystic swelling in the left inguinal region, likely lymphadenopathy.  Genitalia

## 2024-03-18 NOTE — PROGRESS NOTES
2023     Danni Rice 40 y.o. male    : 1980   Chief Complaint:   Cough and Fever      History of Present Illness   Source of history provided by:  patient. Danni Rice is a 40 y.o. old male who presents to [address] [place] [address] for evaluation of cough x 3 days. Associated symptoms include fever (101.4). Since onset symptoms have been consistent. Patient has had no known Covid 19 exposure. Patient has not been diagnosed with COVID-19 in the last 90 days. Has taken OTC medications at home with some symptomatic relief. Denies any fever, chills, CP, dyspnea, LE edema, abdominal pain, nausea, vomiting, rash, dizziness, or lethargy. Denies any history of asthma, pneumonia, recurrent bronchitis or COPD. They have no history of tobacco abuse. ROS   Past Medical History:   Past Medical History:   Diagnosis Date    CAD in native artery 2021    Hyperlipidemia     Hypertension     Kidney stone     Kidney stone      Past Surgical History:  has a past surgical history that includes hernia repair; Tonsillectomy; and Coronary angioplasty with stent (2021). Social History:  reports that he has quit smoking. His smoking use included cigarettes. He smoked an average of .5 packs per day. He has never used smokeless tobacco. He reports that he does not drink alcohol and does not use drugs. Family History: family history is not on file. Allergies: Vicodin [hydrocodone-acetaminophen]    Unless otherwise stated in this report the patient's positive and negative responses for review of systems for constitutional, eyes, ENT, cardiovascular, respiratory, gastrointestinal, neurological, , musculoskeletal, and integument systems and related systems to the presenting problem are either stated in the history of present illness or were not pertinent or were negative for the symptoms and/or complaints related to the presenting medical problem. Positives and pertinent negatives as per HPI. Spoke with Leanne - she states no pharmacy in her area has 1.5 mg Trulicity available   She will call her pharmacy back and see if a lower dose is available and get back to clinic   All others reviewed and are negative. Physical Exam   VS:    Vitals:    01/05/23 0831   BP: 126/74   Pulse: 74   Resp: 18   Temp: 97.8 °F (36.6 °C)   TempSrc: Temporal   SpO2: 98%   Weight: 165 lb (74.8 kg)   Height: 5' 11\" (1.803 m)     Oxygen Saturation Interpretation: Normal.    Constitutional:  Alert, development consistent with age. NAD. Head:  NC/NT. Airway patent. Ear: Bilateral external auditory ear canals are clear there is no cerumen, erythema or debris present. Bilateral tympanic membrane intact, translucent and normal cone of light. There is no serous effusion or drainage present. Nose: Bilateral turbinates are swollen. No septal deviation. Rhinorrhea present. Mouth: Posterior pharynx with mild erythema and clear postnasal drip. No tonsillar hypertrophy or exudate. Neck:  Normal ROM. Supple. No anterior cervical adenopathy noted. Lungs: CTAB without wheezes, rales, or rhonchi. CV:  Regular rate and rhythm, normal heart sounds, without pathological murmurs, ectopy, gallops, or rubs. GI: Bowel sounds active x4. Abdomen is soft nontender nondistended. There is no guarding or rebound tenderness noted. Skin:  Normal turgor. Warm, dry, without visible rash. Lymphatic: No lymphangitis or adenopathy noted. Neurological:  Oriented. Motor functions intact. Lab / Imaging Results   All laboratory and radiology results have been personally reviewed by myself. Labs:  Results for orders placed or performed in visit on 01/05/23   POCT COVID-19, Antigen   Result Value Ref Range    SARS-COV-2, POC Not-Detected Not Detected    Lot Number [id number]     QC Pass/Fail pass     Performing Instrument Virtualtwo [employer]    POCT Influenza A/B   Result Value Ref Range    Influenza A Ab neg     Influenza B Ab neg        Imaging: All Radiology results interpreted by Radiologist unless otherwise noted. No orders to display       Assessment / Plan   Lowanda He was seen today for cough and fever.     Diagnoses and all orders for this visit:    Cough, unspecified type  -     POCT COVID-19, Antigen    Fever, unspecified fever cause  -     POCT Influenza A/B  Disposition:  Disposition: Discharge to home    Pt advised that he tested negative for FLU and COVID today in the office. Pt did not want anything for his cough at this time. Follow-up with PCP in 7 to 10 days. Red flag symptoms were discussed with the patient including high or refractory fever, progressive SOB, dyspnea, CP, calf pain/swelling, shaking chills, vomiting, abdominal pain, lethargy, flank pain, or decreased urinary output. If any of these occur, they should go immediately to the emergency department for further evaluation. All questions were answered. The patient relies understanding and was agreeable to the above plan. Lavell Gilmore, APRN - CNP    *NOTE: This report was transcribed using voice recognition software. Every effort was made to ensure accuracy; however, inadvertent computerized transcription errors may be present.

## 2024-08-20 ENCOUNTER — OFFICE VISIT (OUTPATIENT)
Dept: FAMILY MEDICINE CLINIC | Age: 44
End: 2024-08-20
Payer: COMMERCIAL

## 2024-08-20 VITALS
HEIGHT: 72 IN | RESPIRATION RATE: 18 BRPM | HEART RATE: 74 BPM | WEIGHT: 177 LBS | SYSTOLIC BLOOD PRESSURE: 124 MMHG | DIASTOLIC BLOOD PRESSURE: 76 MMHG | BODY MASS INDEX: 23.98 KG/M2 | OXYGEN SATURATION: 98 % | TEMPERATURE: 97.8 F

## 2024-08-20 DIAGNOSIS — K13.79 ORAL PAIN: Primary | ICD-10-CM

## 2024-08-20 PROCEDURE — 99214 OFFICE O/P EST MOD 30 MIN: CPT

## 2024-08-20 RX ORDER — FAMOTIDINE 20 MG/1
20 TABLET, FILM COATED ORAL 2 TIMES DAILY
COMMUNITY

## 2024-08-20 RX ORDER — AMOXICILLIN AND CLAVULANATE POTASSIUM 875; 125 MG/1; MG/1
1 TABLET, FILM COATED ORAL 2 TIMES DAILY
COMMUNITY

## 2024-08-20 RX ORDER — AMOXICILLIN 250 MG/1
250 CAPSULE ORAL 3 TIMES DAILY
COMMUNITY
End: 2024-08-20 | Stop reason: CLARIF

## 2024-08-20 ASSESSMENT — ENCOUNTER SYMPTOMS
ABDOMINAL PAIN: 0
VOMITING: 0
STRIDOR: 0
APNEA: 0
COUGH: 0

## 2024-08-20 NOTE — PROGRESS NOTES
Chief Complaint:   Mass      History of Present Illness   HPI:  Ramos Rosa is a 44 y.o. male who presents to walk-in today for what he believes is an abscess under his tongue.  He states that he had recent dental procedure where they took out all of his top teeth and gave him dentures.  He currently reports taking Augmentin due to having an infected tooth on the bottom that is waiting to get pulled.  He states that he noticed it 3 days ago however over the past few hours he noticed it has increased in size.He denies any fevers, chills, nausea, vomiting, drooling, trismus, chest pain, or shortness of breath. He is able to swallow liquids and solids.     Prior to Visit Medications    Medication Sig Taking? Authorizing Provider   famotidine (PEPCID) 20 MG tablet Take 1 tablet by mouth 2 times daily Yes Phan Avila MD   amoxicillin (AMOXIL) 250 MG capsule Take 1 capsule by mouth 3 times daily Yes Phan Avila MD   aspirin 81 MG EC tablet Take 1 tablet by mouth daily Yes Deric Dangelo MD   atorvastatin (LIPITOR) 40 MG tablet Take 1 tablet by mouth nightly Yes Deric Dangelo MD   metoprolol succinate (TOPROL XL) 25 MG extended release tablet Take 1 tablet by mouth daily Yes Deric Dangelo MD   azithromycin (ZITHROMAX) 250 MG tablet Take 2 tablets by mouth on day 1, Take 1 tablet by mouth on days 2-5  Patient not taking: Reported on 8/20/2024  Joaquín Bobby PA   nitroGLYCERIN (NITROSTAT) 0.4 MG SL tablet up to max of 3 total doses. If no relief after 1 dose, call 911.  Patient not taking: Reported on 1/30/2024  Deric Dangelo MD   ticagrelor (BRILINTA) 90 MG TABS tablet Take 1 tablet by mouth 2 times daily  Patient not taking: Reported on 1/30/2024  Deric Dangelo MD   omeprazole (PRILOSEC) 40 MG delayed release capsule TAKE ONE CAPSULE BY MOUTH EVERY DAY before a meal  Patient not taking: Reported on 8/20/2024  Phan Avila MD       Review of Systems   Review of